# Patient Record
Sex: MALE | Race: WHITE | NOT HISPANIC OR LATINO | Employment: STUDENT | URBAN - METROPOLITAN AREA
[De-identification: names, ages, dates, MRNs, and addresses within clinical notes are randomized per-mention and may not be internally consistent; named-entity substitution may affect disease eponyms.]

---

## 2017-11-18 ENCOUNTER — APPOINTMENT (OUTPATIENT)
Dept: URGENT CARE | Age: 22
End: 2017-11-18
Payer: COMMERCIAL

## 2017-11-18 PROCEDURE — 99203 OFFICE O/P NEW LOW 30 MIN: CPT | Performed by: FAMILY MEDICINE

## 2018-06-03 ENCOUNTER — OFFICE VISIT (OUTPATIENT)
Dept: URGENT CARE | Age: 23
End: 2018-06-03
Payer: COMMERCIAL

## 2018-06-03 VITALS
RESPIRATION RATE: 18 BRPM | HEART RATE: 60 BPM | WEIGHT: 225 LBS | BODY MASS INDEX: 30.48 KG/M2 | OXYGEN SATURATION: 98 % | TEMPERATURE: 97.2 F | HEIGHT: 72 IN | DIASTOLIC BLOOD PRESSURE: 65 MMHG | SYSTOLIC BLOOD PRESSURE: 143 MMHG

## 2018-06-03 DIAGNOSIS — L60.0 INGROWN TOENAIL OF LEFT FOOT WITH INFECTION: Primary | ICD-10-CM

## 2018-06-03 PROCEDURE — 99213 OFFICE O/P EST LOW 20 MIN: CPT | Performed by: FAMILY MEDICINE

## 2018-06-03 RX ORDER — CIPROFLOXACIN 500 MG/1
500 TABLET, FILM COATED ORAL EVERY 12 HOURS SCHEDULED
Qty: 20 TABLET | Refills: 0 | Status: SHIPPED | OUTPATIENT
Start: 2018-06-03 | End: 2018-06-13

## 2018-06-03 RX ORDER — CETIRIZINE HYDROCHLORIDE 10 MG/1
10 TABLET ORAL DAILY
COMMUNITY

## 2018-06-03 NOTE — PROGRESS NOTES
[facility]        NAME: Madelaine Landers is a 25 y o  male  : 1995    MRN: [id number]  DATE: Hawa 3, 2018  TIME: 5:34 PM    Assessment and Plan   Ingrown toenail of left foot with infection [L60 0]  1  Ingrown toenail of left foot with infection  ciprofloxacin (CIPRO) 500 mg tablet         Patient Instructions       Follow up with PCP in 3-5 days  Proceed to  ER if symptoms worsen  Chief Complaint     Chief Complaint   Patient presents with    Ingrown Toenail     left great toe with pus and pain for approx 5 days  using peroxide         History of Present Illness       Patient is here for evaluation of redness swelling and purulent discharge from his left great toe  Patient has an ingrown toenail and things that may have started  He has been using some warm water and Epsom salt soaks with some relief  But is getting worse  Has more redness at this time  Review of Systems   Review of Systems   Constitutional: Negative  Musculoskeletal: Negative for gait problem  Skin: Positive for wound  Current Medications       Current Outpatient Prescriptions:     cetirizine (ZyrTEC) 10 mg tablet, Take 10 mg by mouth daily, Disp: , Rfl:     ciprofloxacin (CIPRO) 500 mg tablet, Take 1 tablet (500 mg total) by mouth every 12 (twelve) hours for 10 days, Disp: 20 tablet, Rfl: 0    Current Allergies     Allergies as of 2018 - Reviewed 2018   Allergen Reaction Noted    Clindamycin Rash 2018    Codeine Rash 2018            The following portions of the patient's history were reviewed and updated as appropriate: allergies, current medications, past family history, past medical history, past social history, past surgical history and problem list      No past medical history on file  No past surgical history on file  No family history on file  Medications have been verified          Objective   /65   Pulse 60   Temp (!) 97 2 °F (36 2 °C) (Temporal) Resp 18   Ht 6' (1 829 m)   Wt 102 kg (225 lb)   SpO2 98%   BMI 30 52 kg/m²        Physical Exam     Physical Exam   Constitutional: He is oriented to person, place, and time  He appears well-developed and well-nourished  Musculoskeletal:   Left great toe has an ingrown toenail on the medial aspect of the toe with focal swelling and focal erythema  No purulent discharge at this time  The area has some slight warmth to the medial aspect of the toe  Neurological: He is alert and oriented to person, place, and time  Skin: Skin is warm  Psychiatric: He has a normal mood and affect  His behavior is normal    Nursing note and vitals reviewed

## 2018-06-03 NOTE — PATIENT INSTRUCTIONS
1   Leave open to air at home  2   Take probiotics [i e  Yogurt, Acidophilus, Florastor (liquid)] daily  3   Continue warm water and Epsom salt soaks frequently  4    Advance activities as tolerated  5    Follow-up with a podiatrist in 3-4 days  6   Go to emergency room if symptoms are worsening

## 2018-08-07 ENCOUNTER — OFFICE VISIT (OUTPATIENT)
Dept: INTERNAL MEDICINE CLINIC | Age: 23
End: 2018-08-07
Payer: COMMERCIAL

## 2018-08-07 VITALS
OXYGEN SATURATION: 98 % | TEMPERATURE: 97.5 F | WEIGHT: 229.4 LBS | SYSTOLIC BLOOD PRESSURE: 117 MMHG | HEART RATE: 86 BPM | HEIGHT: 71 IN | BODY MASS INDEX: 32.11 KG/M2 | DIASTOLIC BLOOD PRESSURE: 74 MMHG

## 2018-08-07 DIAGNOSIS — J30.2 SEASONAL ALLERGIC RHINITIS, UNSPECIFIED TRIGGER: ICD-10-CM

## 2018-08-07 DIAGNOSIS — Z76.89 ENCOUNTER TO ESTABLISH CARE: Primary | ICD-10-CM

## 2018-08-07 PROBLEM — L60.0 INGROWN TOENAIL OF LEFT FOOT WITH INFECTION: Status: RESOLVED | Noted: 2018-06-03 | Resolved: 2018-08-07

## 2018-08-07 PROBLEM — S06.0X0A CONCUSSION WITH NO LOSS OF CONSCIOUSNESS: Status: RESOLVED | Noted: 2017-02-27 | Resolved: 2018-08-07

## 2018-08-07 PROBLEM — S06.0X0A CONCUSSION WITH NO LOSS OF CONSCIOUSNESS: Status: ACTIVE | Noted: 2017-02-27

## 2018-08-07 PROCEDURE — 99203 OFFICE O/P NEW LOW 30 MIN: CPT | Performed by: NURSE PRACTITIONER

## 2018-08-07 RX ORDER — MOMETASONE FUROATE 50 UG/1
2 SPRAY, METERED NASAL DAILY
COMMUNITY

## 2018-08-07 NOTE — PROGRESS NOTES
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   Return sooner as needed  Subjective:      Patient ID: Aster Campoverde is a 25 y o  male  Patient presents as a new patient to establish care with this practice  He denies new complaints at the present time  Upon review of his past medical history, he reports that he has seasonal allergies for which he takes Nasonex and Zyrtec  He reports that he is not consistent on taking them on a regular basis, and only takes them when needed  He understands that there best effective when taken regularly, but admits that he forgets to take them daily  He also reports that he had 2 significant concussion, for which she is [facility] in 2016  He reports that his symptoms have resolved, with the exception of some memory issues, for which he damien with using a calender in his phone frequently to help remind him of things  He also was unsure if he needs a 2nd meningitis vaccine  He also believes that he is due for a Tdap vaccine shortly, however his pediatric records are not available at the present time  He reports that he has his vaccine records at home and will bring him to the next appointment for review determination of whether he is due for these  He has not had recent labs performed  He does not remember the last time he had a health maintenance examination    He reports that 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, but that he did not bring it with him to this visit  The following portions of the patient's history were reviewed and updated as appropriate: allergies, current medications, past medical history, past social history, past surgical history and problem list     Review of Systems   Constitutional: Negative for chills, fatigue and fever  HENT: Negative for congestion, ear pain, hearing loss, sinus pain and trouble swallowing  Eyes: Negative for pain  Respiratory: Negative for chest tightness, shortness of breath and wheezing  Cardiovascular: Negative for chest pain, palpitations and leg swelling  Gastrointestinal: Negative for abdominal pain, diarrhea, nausea and vomiting  Genitourinary: Negative for dysuria  Musculoskeletal: Negative for gait problem  Skin: Negative for rash  Neurological: Negative for dizziness, syncope, numbness and headaches  Hematological: Does not bruise/bleed easily  Psychiatric/Behavioral: Negative for confusion and decreased concentration  The patient is not nervous/anxious  History reviewed  No pertinent past medical history        Current Outpatient Prescriptions:     cetirizine (ZyrTEC) 10 mg tablet, Take 10 mg by mouth daily, Disp: , Rfl:     mometasone (NASONEX) 50 mcg/act nasal spray, 2 sprays into each nostril daily, Disp: , Rfl:     Allergies   Allergen Reactions    Clindamycin Rash    Codeine Rash       Social History   Past Surgical History:   Procedure Laterality Date    TONSILLECTOMY  2012    WISDOM TOOTH EXTRACTION  2015     Family History   Problem Relation Age of Onset    Diabetes Mother     Hypertension Father     Alzheimer's disease Maternal Grandmother     Cancer Paternal Grandfather     Heart disease Paternal Grandfather        Objective:  /74 (BP Location: Left arm, Patient Position: Sitting, Cuff Size: Standard)   Pulse 86   Temp 97 5 °F (36 4 °C) (Tympanic)   Ht 5' 10 95" (1 802 m)   Wt 104 kg (229 lb 6 4 oz)   SpO2 98%   BMI 32 04 kg/m²        Physical Exam   Constitutional: He is oriented to person, place, and time  He appears well-developed and well-nourished  No distress  HENT:   Head: Normocephalic and atraumatic  Right Ear: External ear normal    Left Ear: External ear normal    Mouth/Throat: Oropharynx is clear and moist    Eyes: Conjunctivae are normal  Pupils are equal, round, and reactive to light  No scleral icterus  Neck: Normal range of motion  Neck supple  No thyromegaly present  Cardiovascular: Normal rate, regular rhythm and normal heart sounds  Pulmonary/Chest: Effort normal and breath sounds normal  No respiratory distress  Abdominal: Soft  Bowel sounds are normal  He exhibits no distension  Musculoskeletal: Normal range of motion  He exhibits no edema  Neurological: He is alert and oriented to person, place, and time  Skin: Skin is warm and dry  Psychiatric: He has a normal mood and affect  His behavior is normal  Judgment and thought content normal    Vitals reviewed

## 2018-08-14 ENCOUNTER — OFFICE VISIT (OUTPATIENT)
Dept: INTERNAL MEDICINE CLINIC | Age: 23
End: 2018-08-14
Payer: COMMERCIAL

## 2018-08-14 VITALS
DIASTOLIC BLOOD PRESSURE: 66 MMHG | TEMPERATURE: 97.7 F | SYSTOLIC BLOOD PRESSURE: 110 MMHG | BODY MASS INDEX: 31.89 KG/M2 | HEIGHT: 71 IN | OXYGEN SATURATION: 98 % | WEIGHT: 227.8 LBS | HEART RATE: 103 BPM

## 2018-08-14 DIAGNOSIS — Z00.00 HEALTHCARE MAINTENANCE: Primary | ICD-10-CM

## 2018-08-14 DIAGNOSIS — E66.09 CLASS 1 OBESITY DUE TO EXCESS CALORIES WITHOUT SERIOUS COMORBIDITY WITH BODY MASS INDEX (BMI) OF 31.0 TO 31.9 IN ADULT: ICD-10-CM

## 2018-08-14 DIAGNOSIS — Z13.220 SCREENING FOR HYPERLIPIDEMIA: ICD-10-CM

## 2018-08-14 PROBLEM — E66.811 CLASS 1 OBESITY DUE TO EXCESS CALORIES IN ADULT: Status: ACTIVE | Noted: 2018-08-14

## 2018-08-14 PROCEDURE — 99385 PREV VISIT NEW AGE 18-39: CPT | Performed by: NURSE PRACTITIONER

## 2018-08-14 NOTE — PROGRESS NOTES
Assessment/Plan:    Continue with healthy lifestyle  Exercise on a routine basis as well as healthy diet  Patient is sexually active  No concerns for STDs  He declines STD screening  Forms completed for [employer] [facility]    Will update routine labs     Diagnoses and all orders for this visit:    Healthcare maintenance    Screening for hyperlipidemia  -     Lipid panel; Future    Class 1 obesity due to excess calories without serious comorbidity with body mass index (BMI) of 31 0 to 31 9 in adult  -     CBC and differential; Future  -     Comprehensive metabolic panel; Future  -     Lipid panel; Future  -     TSH, 3rd generation with Free T4 reflex; Future        Subjective:      Patient ID: Marcia Alfaro is a 25 y o  male and presents today for Health Maintenance Physical     Pt needs forms completed for [employer] [facility]    Last Physical: few years ago    Pt reports overall health:  healthy    Healthy Diet:  Overall healthy  Eating primarily chicken, vegetable  High protein/high carb  Routine Exercise:  Cardio, light weight lifting, agility, approx 5-6 times a week  Weight Concerns:  no    Problems with vision:  Yes  Glasses  No contacts  Last Eye Exam:  1 year ago    Problems with Hearing:  no    Routine Dental Exams:  yes    Smoking History:  no  ETOH Use:  Socially, reduced greatly the pass few months  Illegal Drug Use:  no  Caffeine Use:  Coffee daily approx 12 oz  Testicular self exam:  no    Family History of prostate CA:  no  Family History of Colon CA:  No  Family history of testicular ca:  no    Immunizations:  Consider trumemba if living in [facility]     Patient states he has been exercising on a routine basis  Patient denies any chest pain, palpitations, syncope, near-syncope, lightheadedness, dizziness with exercise  States he has never had to stop exercising secondary to above reason  He denies a cardiac history    He denies a family history of sudden death prior to age of 48    The following portions of the patient's history were reviewed and updated as appropriate: allergies, current medications, past family history, past medical history, past social history, past surgical history and problem list     Review of Systems   Constitutional: Negative for chills, fatigue and fever  HENT: Negative for congestion, postnasal drip, sinus pain, sinus pressure and sore throat  Eyes: Negative for visual disturbance  Respiratory: Negative for cough, shortness of breath and wheezing  Cardiovascular: Negative for chest pain, palpitations and leg swelling  Gastrointestinal: Negative for abdominal pain, blood in stool, constipation, diarrhea, nausea and vomiting  Genitourinary: Negative for dysuria and hematuria  Musculoskeletal: Negative for arthralgias  Skin: Negative for rash  Neurological: Negative for dizziness, weakness, light-headedness and headaches  Psychiatric/Behavioral: Negative for behavioral problems, dysphoric mood and sleep disturbance  The patient is not nervous/anxious  No past medical history on file        Current Outpatient Prescriptions:     cetirizine (ZyrTEC) 10 mg tablet, Take 10 mg by mouth daily, Disp: , Rfl:     mometasone (NASONEX) 50 mcg/act nasal spray, 2 sprays into each nostril daily, Disp: , Rfl:     Allergies   Allergen Reactions    Clindamycin Rash    Codeine Rash       Social History   Past Surgical History:   Procedure Laterality Date    TONSILLECTOMY  2012    WISDOM TOOTH EXTRACTION  2015     Family History   Problem Relation Age of Onset    Diabetes Mother     Hypertension Father     Alzheimer's disease Maternal Grandmother     Cancer Paternal Grandfather     Heart disease Paternal Grandfather        Objective:  /66 (BP Location: Left arm, Patient Position: Sitting, Cuff Size: Large)   Pulse 103   Temp 97 7 °F (36 5 °C) (Tympanic)   Ht 5' 10 95" (1 802 m)   Wt 103 kg (227 lb 12 8 oz)   SpO2 98%   BMI 31 82 kg/m²      Physical Exam   Constitutional: He is oriented to person, place, and time  He appears well-developed and well-nourished  No distress  HENT:   Head: Normocephalic and atraumatic  Right Ear: Hearing, tympanic membrane, external ear and ear canal normal    Left Ear: Hearing, tympanic membrane, external ear and ear canal normal    Nose: Nose normal    Mouth/Throat: Uvula is midline, oropharynx is clear and moist and mucous membranes are normal    Neck: No thyromegaly present  Cardiovascular: Normal rate and regular rhythm  Pulmonary/Chest: Effort normal and breath sounds normal  No respiratory distress  He has no wheezes  Abdominal: Soft  Bowel sounds are normal  He exhibits no distension and no mass  There is no tenderness  There is no rebound and no guarding  Hernia confirmed negative in the right inguinal area and confirmed negative in the left inguinal area  Genitourinary: Penis normal  Right testis shows no mass, no swelling and no tenderness  Left testis shows no mass, no swelling and no tenderness  Neurological: He is alert and oriented to person, place, and time  No focal deficits   Skin: Skin is warm and dry  No rash noted  Psychiatric: He has a normal mood and affect  His behavior is normal  Judgment and thought content normal    Nursing note and vitals reviewed

## 2019-03-29 ENCOUNTER — OFFICE VISIT (OUTPATIENT)
Dept: INTERNAL MEDICINE CLINIC | Age: 24
End: 2019-03-29
Payer: COMMERCIAL

## 2019-03-29 VITALS
HEIGHT: 71 IN | WEIGHT: 220.4 LBS | OXYGEN SATURATION: 98 % | SYSTOLIC BLOOD PRESSURE: 132 MMHG | BODY MASS INDEX: 30.85 KG/M2 | HEART RATE: 72 BPM | DIASTOLIC BLOOD PRESSURE: 70 MMHG | TEMPERATURE: 98 F

## 2019-03-29 DIAGNOSIS — J01.00 ACUTE NON-RECURRENT MAXILLARY SINUSITIS: Primary | ICD-10-CM

## 2019-03-29 PROCEDURE — 3008F BODY MASS INDEX DOCD: CPT | Performed by: PHYSICIAN ASSISTANT

## 2019-03-29 PROCEDURE — 99213 OFFICE O/P EST LOW 20 MIN: CPT | Performed by: PHYSICIAN ASSISTANT

## 2019-03-29 PROCEDURE — 1036F TOBACCO NON-USER: CPT | Performed by: PHYSICIAN ASSISTANT

## 2019-03-29 RX ORDER — AMOXICILLIN 875 MG/1
875 TABLET, COATED ORAL 2 TIMES DAILY
Qty: 14 TABLET | Refills: 0 | Status: SHIPPED | OUTPATIENT
Start: 2019-03-29 | End: 2019-04-05

## 2019-03-29 NOTE — PROGRESS NOTES
[facility]  Standard Office Visit  Patient ID: Chuck Campa    : 1995  Age/Gender: 26 y o  male     DATE: 3/29/2019      Assessment/Plan:    Acute non-recurrent maxillary sinusitis  Discussed importance of increased rest increase fluids and hand hygiene  Patient currently is taking Zyrtec 10 mg daily  He previously was using Flonase only as needed  Recommended he use Flonase 1 spray to each nostril daily  Increase rest increased fluids  Hand hygiene  Amoxicillin 875 mg by mouth twice daily for 7 days  Report back if any worsening or if sx do no fully resolve  Diagnoses and all orders for this visit:    Acute non-recurrent maxillary sinusitis  -     amoxicillin (AMOXIL) 875 mg tablet; Take 1 tablet (875 mg total) by mouth 2 (two) times a day for 7 days          Subjective:   Chief Complaint   Patient presents with    Sore Throat     Pt is here today for sore throat since yesterday morning    Cough     Pt has been coughing since 2-3 days  Chuck Campa is a 26 y o  male who presents to the office on 3/29/2019 for     For eval of cold sx  Takes zyrtec and Flonase daily for seasonal allergies  He takes these meds year round  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  + sick contact brother with similar sx  Patient's sx started 2-3 days ago  Did not have ST prior, now has a ST  Feels raw  He has PND dripping at back of his throat  Tonsils were taken out due to hx of recurrent strep throat  Pain at back of throat  Feels like his ear needs to pop  Taking OTC mucinex  Patient notes he is able to eat and drink  Low-grade temp at home  Sinus pressure and pain  Ears feel blocked  Sore Throat    This is a new problem  The current episode started in the past 7 days  The problem has been gradually worsening  There has been no fever  The pain is at a severity of 4/10  The pain is mild   Associated symptoms include congestion (nasal congestion), coughing (Dry, brings up stuff from back of his throat   ), ear pain (pressure in his ear, fullness) and a plugged ear sensation  Pertinent negatives include no abdominal pain, diarrhea, headaches, hoarse voice, neck pain, shortness of breath, swollen glands, trouble swallowing or vomiting  The treatment provided mild relief  Cough   This is a new problem  The current episode started in the past 7 days  The problem has been waxing and waning  The problem occurs every few minutes  The cough is non-productive  Associated symptoms include ear pain (pressure in his ear, fullness), rhinorrhea and a sore throat  Pertinent negatives include no chest pain, headaches, shortness of breath or wheezing  Nothing aggravates the symptoms  The following portions of the patient's history were reviewed and updated as appropriate: allergies, current medications, past family history, past medical history, past social history, past surgical history and problem list     Review of Systems   HENT: Positive for congestion (nasal congestion), ear pain (pressure in his ear, fullness), rhinorrhea and sore throat  Negative for hoarse voice and trouble swallowing  Respiratory: Positive for cough (Dry, brings up stuff from back of his throat  )  Negative for shortness of breath and wheezing  Cardiovascular: Negative for chest pain and palpitations  Gastrointestinal: Negative for abdominal pain, diarrhea, nausea and vomiting  Musculoskeletal: Negative for neck pain  Neurological: Negative for headaches  Patient Active Problem List   Diagnosis    Seasonal allergic rhinitis    Healthcare maintenance    Screening for hyperlipidemia    Class 1 obesity due to excess calories in adult    Acute non-recurrent maxillary sinusitis       History reviewed  No pertinent past medical history      Past Surgical History:   Procedure Laterality Date    TONSILLECTOMY  2012    DEANNA TOOTH EXTRACTION  2015         Current Outpatient Medications:     cetirizine (ZyrTEC) 10 mg tablet, Take 10 mg by mouth daily, Disp: , Rfl:     mometasone (NASONEX) 50 mcg/act nasal spray, 2 sprays into each nostril daily, Disp: , Rfl:     amoxicillin (AMOXIL) 875 mg tablet, Take 1 tablet (875 mg total) by mouth 2 (two) times a day for 7 days, Disp: 14 tablet, Rfl: 0    Allergies   Allergen Reactions    Clindamycin Rash    Codeine Rash       Social History     Socioeconomic History    Marital status: Single     Spouse name: None    Number of children: None    Years of education: None    Highest education level: None   Occupational History    None   Social Needs    Financial resource strain: None    Food insecurity:     Worry: None     Inability: None    Transportation needs:     Medical: None     Non-medical: None   Tobacco Use    Smoking status: Never Smoker    Smokeless tobacco: Never Used   Substance and Sexual Activity    Alcohol use: Yes     Comment: social use    Drug use: No    Sexual activity: None   Lifestyle    Physical activity:     Days per week: None     Minutes per session: None    Stress: None   Relationships    Social connections:     Talks on phone: None     Gets together: None     Attends [employer] service: None     Active member of club or organization: None     Attends meetings of clubs or organizations: None     Relationship status: None    Intimate partner violence:     Fear of current or ex partner: None     Emotionally abused: None     Physically abused: None     Forced sexual activity: None   Other Topics Concern    None   Social History Narrative    None       Family History   Problem Relation Age of Onset    Diabetes Mother     Hypertension Father     Cancer Father     Alzheimer's disease Maternal Grandmother     Cancer Paternal Grandfather     Heart disease Paternal Grandfather        PHQ-9 Depression Screening    PHQ-9:    Frequency of the following problems over the past two weeks:              Health Maintenance   Topic Date Due    BMI: Followup Plan  10/14/2014    INFLUENZA VACCINE  03/29/2020 (Originally 7/1/2018)    Depression Screening PHQ  06/03/2019    BMI: Adult  08/14/2019    DTaP,Tdap,and Td Vaccines (7 - Td) 03/14/2028    HEPATITIS B VACCINES  Completed       Immunization History   Administered Date(s) Administered    DTaP,unspecified 01/15/1997, 03/19/1997, 05/21/1997, 01/29/1998, 08/06/2001    HPV 11/24/2012, 02/02/2013, 06/15/2013    Hep A, ped/adol, 2 dose 05/17/2010, 05/31/2011    Hep B, Adolescent or Pediatric 1995, 01/15/1997, 05/21/1997    HiB 01/15/1997, 03/19/1997, 05/21/1997, 10/31/1997    INFLUENZA 12/26/2005, 01/29/2007, 11/11/2008, 12/02/2009, 11/09/2010, 08/28/2012    IPV 08/06/2001    MMR 10/31/1997, 12/11/2000    Meningococcal ACWY, unspecified 03/14/2008, 11/24/2012    OPV 01/15/1997, 03/19/1997, 05/21/1997    Tdap 03/14/2018    Varicella 10/31/1997, 03/12/2007        Objective:  Vitals:    03/29/19 0904   BP: 132/70   BP Location: Left arm   Patient Position: Sitting   Cuff Size: Standard   Pulse: 72   Temp: 98 °F (36 7 °C)   TempSrc: Tympanic   SpO2: 98%   Weight: 100 kg (220 lb 6 4 oz)   Height: 5' 11 5" (1 816 m)     Wt Readings from Last 3 Encounters:   03/29/19 100 kg (220 lb 6 4 oz)   08/14/18 103 kg (227 lb 12 8 oz)   08/07/18 104 kg (229 lb 6 4 oz)     Body mass index is 30 31 kg/m²  No exam data present       Physical Exam   Constitutional: He is oriented to person, place, and time  He appears well-developed and well-nourished  No distress  Alert pleasant cooperative  Seated in room wearing mask    HENT:   Head: Normocephalic and atraumatic  Right Ear: No drainage or swelling  Tympanic membrane is not erythematous and not bulging  A middle ear effusion is present  Left Ear: No drainage or swelling  Tympanic membrane is not erythematous and not bulging  No middle ear effusion  Mouth/Throat: Uvula is midline and mucous membranes are normal  No oral lesions  No uvula swelling  Posterior oropharyngeal erythema present  No oropharyngeal exudate, posterior oropharyngeal edema or tonsillar abscesses (Tonsils surgically absent  )  Eyes: Pupils are equal, round, and reactive to light  Right eye exhibits no discharge  Left eye exhibits no discharge  No scleral icterus  Neck: Neck supple  Cardiovascular: Normal rate, regular rhythm and normal heart sounds  No murmur heard  Pulmonary/Chest: Effort normal and breath sounds normal  No respiratory distress  He has no wheezes  He has no rales  Abdominal: Soft  Bowel sounds are normal  He exhibits no distension  There is no tenderness  There is no guarding  Musculoskeletal: He exhibits no edema  Lymphadenopathy:     He has cervical adenopathy  Neurological: He is alert and oriented to person, place, and time  Skin: Skin is warm and dry  No rash noted  He is not diaphoretic  Psychiatric: He has a normal mood and affect  His behavior is normal  Thought content normal    Nursing note and vitals reviewed  No future appointments      Nazario Jensen PA-C   [facility]    Patient Care Team:  Agnieszka Wright DO as PCP - General (Family Medicine)

## 2019-03-29 NOTE — ASSESSMENT & PLAN NOTE
Discussed importance of increased rest increase fluids and hand hygiene  Patient currently is taking Zyrtec 10 mg daily  He previously was using Flonase only as needed  Recommended he use Flonase 1 spray to each nostril daily  Increase rest increased fluids  Hand hygiene  Amoxicillin 875 mg by mouth twice daily for 7 days  Report back if any worsening or if sx do no fully resolve

## 2019-03-29 NOTE — PATIENT INSTRUCTIONS
Acute non-recurrent maxillary sinusitis  Discussed importance of increased rest increase fluids and hand hygiene  Patient currently is taking Zyrtec 10 mg daily  He previously was using Flonase only as needed  Recommended he use Flonase 1 spray to each nostril daily  Increase rest increased fluids  Hand hygiene  Amoxicillin 875 mg by mouth twice daily for 7 days  Report back if any worsening or if sx do no fully resolve

## 2020-05-02 ENCOUNTER — TELEPHONE (OUTPATIENT)
Dept: PEDIATRICS CLINIC | Facility: CLINIC | Age: 25
End: 2020-05-02

## 2023-08-31 ENCOUNTER — TELEPHONE (OUTPATIENT)
Age: 28
End: 2023-08-31

## 2023-08-31 NOTE — TELEPHONE ENCOUNTER
Caller: Adri Eller w/ [facility]     Doctor: C    Reason for call: Calling to see if patient has apt     Call back#: n/a

## 2023-09-08 ENCOUNTER — TELEPHONE (OUTPATIENT)
Age: 28
End: 2023-09-08

## 2023-09-08 NOTE — TELEPHONE ENCOUNTER
Caller: Patient    Doctor: Nicolas Guerrero    Reason for call: Received cd from [place] with previous records. Noticed a MRI w.con right shoulder and the xray of right shoulder was missing. States if needs to get missing images will be a month and half wait time. Will Dr Nicolas Guerrero still see patient?     Call back#: [phone]

## 2023-09-11 NOTE — TELEPHONE ENCOUNTER
If the studies are new - we will need them. Dr Vianney Diop render an opinion without them and it will not be a fruitful visit. If the studies are old, then we will see him while he waits for the images, but should try to get the reports.

## 2023-09-12 NOTE — TELEPHONE ENCOUNTER
Caller: Patient     Doctor: Shaniec Enciso     Reason for call: Patient has the reports but not images, patient will bring them in.  He is unable to get the images     Call back#: [phone]

## 2023-09-19 NOTE — TELEPHONE ENCOUNTER
Patient was called if any films available of X-rays and MRI. Per patient unable to obtain the films from 2022 since done in [place]. Patient made aware will need to then do XR at the visit. Patient verbally understood.

## 2023-09-21 ENCOUNTER — OFFICE VISIT (OUTPATIENT)
Dept: OBGYN CLINIC | Facility: OTHER | Age: 28
End: 2023-09-21
Payer: COMMERCIAL

## 2023-09-21 ENCOUNTER — APPOINTMENT (OUTPATIENT)
Dept: RADIOLOGY | Facility: OTHER | Age: 28
End: 2023-09-21
Payer: COMMERCIAL

## 2023-09-21 VITALS
BODY MASS INDEX: 33.86 KG/M2 | WEIGHT: 250 LBS | HEIGHT: 72 IN | DIASTOLIC BLOOD PRESSURE: 90 MMHG | SYSTOLIC BLOOD PRESSURE: 126 MMHG

## 2023-09-21 DIAGNOSIS — M25.511 CHRONIC RIGHT SHOULDER PAIN: ICD-10-CM

## 2023-09-21 DIAGNOSIS — G89.29 CHRONIC RIGHT SHOULDER PAIN: ICD-10-CM

## 2023-09-21 DIAGNOSIS — M25.311 INSTABILITY OF RIGHT SHOULDER JOINT: Primary | ICD-10-CM

## 2023-09-21 DIAGNOSIS — M24.011 LOOSE BODY IN RIGHT SHOULDER: ICD-10-CM

## 2023-09-21 DIAGNOSIS — M25.511 RIGHT SHOULDER PAIN, UNSPECIFIED CHRONICITY: ICD-10-CM

## 2023-09-21 PROCEDURE — 99204 OFFICE O/P NEW MOD 45 MIN: CPT | Performed by: ORTHOPAEDIC SURGERY

## 2023-09-21 PROCEDURE — 73030 X-RAY EXAM OF SHOULDER: CPT

## 2023-09-21 RX ORDER — VARENICLINE TARTRATE 0.5 MG/1
0.5 TABLET, FILM COATED ORAL
COMMUNITY
Start: 2023-08-22

## 2023-09-21 RX ORDER — ERGOCALCIFEROL 1.25 MG/1
CAPSULE ORAL
COMMUNITY
Start: 2023-07-17

## 2023-09-21 NOTE — PATIENT INSTRUCTIONS
Diagnosis ICD-10-CM Associated Orders   1. Instability of right shoulder joint  M25.311 MRI shoulder right wo contrast      2. Chronic right shoulder pain  M25.511 XR shoulder 2+ vw right    G89.29 MRI shoulder right wo contrast      3. Loose body in right shoulder  x's 2  M24.011 MRI shoulder right wo contrast        Recommend updating his imaging with a new MRI. Discussed a right shoulder arthroscopy    Return for re-check after right shoulder MRI.

## 2023-09-21 NOTE — PROGRESS NOTES
Assessment  Diagnoses and all orders for this visit:    Instability of right shoulder joint - suspected posterior labrum tear  -     MRI shoulder right wo contrast; Future    Loose body in right shoulder  x's 2  -     MRI shoulder right wo contrast; Future          Discussion and Plan:    · The patient has an examination and clinical symptoms consistent with posterior labral pathology and recurrent instability with at least 2 separate intra-articular bodies seen on plain x-ray. He does have an outside MRI arthrogram from [place] in 2022 which we cannot see in the report is hard to interpret, given his persistent symptoms I do recommend updating the imaging so that we can better understand the best way to address his symptoms surgically. · In the presence of his chronic instability symptoms recommend updating his imaging with an MRI on the 3T unit for his right shoulder for further evaluation. On his plain radiographs he does have multiple loose bodies which will be better identified on the MRI scan. We are not obtaining a new MRI arthrogram as they are taking months to schedule and the 3 Deb magnet should provide us with enough information to help guide him properly with the type of surgical intervention, I suspect will show me labral pathology that would require arthroscopic labral repair to stabilize the shoulder. .   · Discussed that arthroscopy of his right shoulder but recommend updating his imaging for preoperative planning. Return for re-check after right shoulder MRI. Subjective:   Patient ID: Tiara Gallardo is a 33 y.o. male      69-year-old right-hand-dominant male presents today for evaluation of his right shoulder due to pain, instability and chronic dysfunction. He was in active [address] duty in 2021 when doing a log run and his partner tripped and fell resulting in the 250 pound log directly impacting his right shoulder resulting in a subluxation episode.   He has multiple episodes of instability. He had 2 years of physical therapy. He had injections of cortisone as well as activity modification and time. He has pain if he rolls onto his right shoulder at night. Lately he has been complaining of burning nerve pain to his forearm which is intermittent. He finds that his right shoulder symptoms limit his day-to-day activities as well as impedes on his quality of life. The following portions of the patient's history were reviewed and updated as appropriate: allergies, current medications, past family history, past medical history, past social history, past surgical history and problem list.    Review of Systems   All other systems reviewed and are negative. Objective:  /90   Ht 5' 11.5" (1.816 m)   Wt 113 kg (250 lb)   BMI 34.38 kg/m²       Right Shoulder Exam     Range of Motion   Right shoulder active abduction: 45 degrees. Forward flexion: 90 (passively to 110 degrees with appearance of subluxation)     Other   Erythema: absent  Sensation: normal    Comments:    Fairly decent strength however has quick fatigue with mild discomfort upon resistance            Physical Exam      Diagnostics, reviewed and taken today if performed as documented: The attending physician has personally reviewed the pertinent films in PACS and interpretation is as follows:    Right shoulder x-rays taken and reviewed in the office today show: Located glenohumeral joint with 2 ossific bodies adjacent to the proximal humerus.        Scribe Attestation    I,:  Issa Solis am acting as a scribe while in the presence of the attending physician.:       I,:  Michael Recinos MD personally performed the services described in this documentation    as scribed in my presence.:

## 2023-10-04 ENCOUNTER — HOSPITAL ENCOUNTER (OUTPATIENT)
Dept: RADIOLOGY | Age: 28
Discharge: HOME | End: 2023-10-04
Payer: COMMERCIAL

## 2023-10-04 DIAGNOSIS — M25.311 INSTABILITY OF RIGHT SHOULDER JOINT: ICD-10-CM

## 2023-10-04 DIAGNOSIS — M25.511 CHRONIC RIGHT SHOULDER PAIN: ICD-10-CM

## 2023-10-04 DIAGNOSIS — G89.29 CHRONIC RIGHT SHOULDER PAIN: ICD-10-CM

## 2023-10-04 DIAGNOSIS — M24.011 LOOSE BODY IN RIGHT SHOULDER: ICD-10-CM

## 2023-10-04 PROCEDURE — 73221 MRI JOINT UPR EXTREM W/O DYE: CPT

## 2023-10-04 PROCEDURE — G1004 CDSM NDSC: HCPCS

## 2023-10-09 ENCOUNTER — OFFICE VISIT (OUTPATIENT)
Dept: OBGYN CLINIC | Facility: OTHER | Age: 28
End: 2023-10-09
Payer: COMMERCIAL

## 2023-10-09 VITALS
WEIGHT: 250 LBS | HEART RATE: 62 BPM | HEIGHT: 72 IN | SYSTOLIC BLOOD PRESSURE: 130 MMHG | BODY MASS INDEX: 33.86 KG/M2 | DIASTOLIC BLOOD PRESSURE: 72 MMHG

## 2023-10-09 DIAGNOSIS — S43.431D LABRAL TEAR OF SHOULDER, RIGHT, SUBSEQUENT ENCOUNTER: Primary | ICD-10-CM

## 2023-10-09 DIAGNOSIS — M24.011 LOOSE BODY IN SHOULDER JOINT, RIGHT: ICD-10-CM

## 2023-10-09 PROCEDURE — 99214 OFFICE O/P EST MOD 30 MIN: CPT | Performed by: ORTHOPAEDIC SURGERY

## 2023-10-09 RX ORDER — CHLORHEXIDINE GLUCONATE ORAL RINSE 1.2 MG/ML
15 SOLUTION DENTAL ONCE
Status: CANCELLED | OUTPATIENT
Start: 2023-10-09 | End: 2023-10-09

## 2023-10-09 NOTE — PROGRESS NOTES
Assessment  Diagnoses and all orders for this visit:    Labral tear of shoulder, right, subsequent encounter    Loose body in the right shoulder joint      Discussion and Plan:    The patient has an examination and MRI consistent with a posterior labral tear. I have discussed with the patient the pathophysiology of this diagnosis and reviewed how the examination correlates with this diagnosis. Surgical vs conservative treatment options were discussed at length and after discussing these treatment options, the patient elected to proceed with a right shoulder arthroscopic labral tear with removal of loose body. A thorough discussion was had regarding the risks and benefits of the procedure. Risks discussed include but not limited to infection, neurovascular injury, risk of anesthesia, recurrent tear of the labrum, need for further surgery, stiffness requiring prolonged rehabilitation or return to the operating room for manipulation. After this discussion, all questions were answered and informed consent was obtained in the office for arthroscopic shoulder posterior labral repair. Subjective:   Patient ID: Maribell German is a 33 y.o. male      HPI  Patient presents today to discuss the findings of his right shoulder MRI. He was in active [address] duty in 2021 when doing a log run and his partner tripped and fell resulting in the 250 pound log directly impacting his right shoulder resulting in a subluxation episode. He has multiple episodes of instability. He had 2 years of physical therapy. He had injections of cortisone as well as activity modification and time. He has pain if he rolls onto his right shoulder at night. Lately he has been complaining of burning nerve pain to his forearm which is intermittent. He finds that his right shoulder symptoms limit his day-to-day activities as well as impedes on his quality of life.       The following portions of the patient's history were reviewed and updated as appropriate: allergies, current medications, past family history, past medical history, past social history, past surgical history and problem list.    Review of Systems   Constitutional: Negative for chills and fever. HENT: Negative for drooling and hearing loss. Eyes: Negative for visual disturbance. Respiratory: Negative for cough and shortness of breath. Cardiovascular: Negative for chest pain. Gastrointestinal: Negative for abdominal pain. Skin: Negative for rash. Psychiatric/Behavioral: Negative for agitation. Objective:  /72   Pulse 62   Ht 5' 11.5" (1.816 m)   Wt 113 kg (250 lb)   BMI 34.38 kg/m²     Right Shoulder Exam      Range of Motion   Right shoulder active abduction: 45 degrees. Forward flexion: 90 (passively to 110 degrees with appearance of subluxation)      Other   Erythema: absent  Sensation: normal     Comments:    Fairly decent strength however has quick fatigue with mild discomfort upon resistance       Physical Exam  Vitals and nursing note reviewed. Constitutional:       Appearance: He is well-developed. HENT:      Head: Normocephalic and atraumatic. Eyes:      Pupils: Pupils are equal, round, and reactive to light. Cardiovascular:      Rate and Rhythm: Normal rate and regular rhythm. Pulses: Normal pulses. Heart sounds: Normal heart sounds. Pulmonary:      Effort: Pulmonary effort is normal. No respiratory distress. Breath sounds: Normal breath sounds. Abdominal:      General: Abdomen is flat. There is no distension. Palpations: Abdomen is soft. Musculoskeletal:      Cervical back: Normal range of motion and neck supple. Skin:     General: Skin is warm and dry. Neurological:      Mental Status: He is alert and oriented to person, place, and time. Psychiatric:         Behavior: Behavior normal.           I have personally reviewed pertinent films in PACS and my interpretation is as follows.   MRI right shoulder demonstrates posterior labral tear with intra-articular loose body.      Scribe Attestation    I,:  Andrea Barlow am acting as a scribe while in the presence of the attending physician.:       I,:  Kitty Lucia MD personally performed the services described in this documentation    as scribed in my presence.:

## 2023-10-09 NOTE — PATIENT INSTRUCTIONS
You are being scheduled for a shoulder arthroscopy to treat your symptoms. Below are some instructions and information on what to expect before and after your surgery. Pre-Surgical Preparation for Arthroscopic Shoulder Surgery: You will be contacted the evening prior to your surgery to confirm the scheduled time of the procedure and when to arrive at the hospital.   Do not eat or drink anything after midnight the night before your surgery. Since you are having out-patient surgery, make sure that you have someone who can drive you home later in the day. Also, prepare that person for a long day, as the process of safely preparing for and recovering from the procedure is more time consuming than the actual procedure! As you will be in a sling after surgery, please wear or bring a loose fitting button-down shirt so that you can easily place this over the sling when you leave the surgical suite. This avoids having to place the operative arm in a sleeve. Most patients find that this is the easiest outfit to wear for the first week or so after surgery so you may want to plan accordingly. Most patients find that lying down in bed after shoulder surgery accentuates their discomfort. This is likely related to the effect of gravity on the swelling in the shoulder. As a result, most patients sleep better in a recliner or in bed with pillows propped up behind their back for the first few days or weeks after surgery. It is a good idea to plan for this ahead of time so there will be less hassle getting things set up the night after surgery. What to Expect After Arthroscopic Shoulder Surgery: It is normal to have swelling and discomfort in the shoulder for several days or a week after surgery. It is also normal to have a small amount of drainage from the surgical wounds (especially the first few days after surgery), as we put fluid into the shoulder to visualize the structures during surgery. It is NOT normal to have foul smelling, purulent drainage and if this is noted, please contact the office immediately or proceed to the emergency room for evaluation as this may indicate an infection. Applying ice bags to the shoulder may help with pain that is not controlled by the regional block. Ice should be applied 20-30 minutes at a time, every hour or two. Make sure to put a thin towel or T-shirt next to your skin to avoid direct contact of the ice with the skin. Icing is most helpful in the first 48 hours, although many people find that continuing past this time frame lessens their postoperative pain. Please note that your post-operative dressing is not conductive to ice, so if you need to, it is okay to remove that dressing even the night after surgery and place band-aids over the wounds in order for the ice to take effect. Pain Control    Most patients will receive a nerve block, the local anesthetic may keep your whole arm numb for up to 4 days. You will be given a prescription for narcotic pain medication when you are discharged from the hospital.  With the newer nerve block that is being utilized, patients are rarely requiring the use of this narcotic pain medication. If you find you do not tolerate that type of pain medicine well, call our office and we will try another one. In addition to the narcotic pain medication, it is safe to use an anti-inflammatory (unless the patient has a medical condition that would not allow safe use of this mediation). This includes the Advil, Motrin, Ibuprofen and Alleve category of medications. Simply follow the over the counter dosing on the package and use as indicated as another adjunct. Importantly since these medications are all very similar, use only one of them. Tylenol is a separate medication that can be utilized as well and can be taken at the same time as the other medication or given in a "staggered" manner.   Just make sure that you follow the dosing on the over the counter bottle instructions. Also make sure that the pain medication prescribed by Dr Lizandro Downing team does not contain acetaminophen (this is found in Percocet and Vicodin). Typically we do not prescribe those types of pain medications but if for some reason that has been prescribed DO NOT add more Tylenol (acetaminophen) as you could end up taking too much of that medication. As mentioned above, most patients find that lying down accentuates their discomfort. You might sleep better in a recliner, or propped up in bed. Dr. Claude Garay encourages patients to safely ambulate around the house as much as possible in the first few days after the procedure as this can help with blood circulation in the legs. While the incidence of blood clots is very rare following shoulder surgery, early ambulation is a great way to help decrease the already low rate. 24 hours after the surgery you may remove the bandage and cover incisions with Band-Aids if needed. At that time you may shower, the wounds will have a surgical glue that will protect them from shower water but do not submerge your incisions directly (bathing or swimming) until at least 2 weeks post-operatively. It is safe to let the arm hang at the side and take a shower and put on a shirt without the sling on. Just make sure that you do not use the operative are to reach out and grab anything as that may damage the repair. When you are done showering and getting dressed please return the operative arm to the sling. Unless noted otherwise in your discharge paperwork, Dr. Claude Garay uses absorbable sutures so they do not need to be removed. Dr. Hortensia Curiel physician assistant (PA) will see you in the office a few days after the procedure to review the intra-operative findings and to initiate physical therapy if appropriate.   A post-operative appointment should have been scheduled for you already, but if for some reason this did not happen, please call the office to make one. Physical therapy is important after nearly all shoulder surgeries and a detailed rehabilitation plan based on the specific intra-operative findings and procedures will be provided to your therapist at the first post-operative office visit. Most patients have post-operative therapy appointments scheduled pre-operatively, but if you do not, that will be handled at the first post-operative office visit. Unless expressly directed otherwise it is safe to remove the sling even the first day after the surgery and let the arm hang by the side. This allows patients to shower and even put a shirt on (bad arm in the sleeve first). It is also safe to flex and extend their wrist, hand and fingers as much as possible when the block wears off. These simple motions can serve to pump fluid out of the forearm and decrease swelling in the arm.

## 2023-10-09 NOTE — H&P (VIEW-ONLY)
Assessment  Diagnoses and all orders for this visit:    Labral tear of shoulder, right, subsequent encounter    Loose body in the right shoulder joint      Discussion and Plan:    The patient has an examination and MRI consistent with a posterior labral tear. I have discussed with the patient the pathophysiology of this diagnosis and reviewed how the examination correlates with this diagnosis. Surgical vs conservative treatment options were discussed at length and after discussing these treatment options, the patient elected to proceed with a right shoulder arthroscopic labral tear with removal of loose body. A thorough discussion was had regarding the risks and benefits of the procedure. Risks discussed include but not limited to infection, neurovascular injury, risk of anesthesia, recurrent tear of the labrum, need for further surgery, stiffness requiring prolonged rehabilitation or return to the operating room for manipulation. After this discussion, all questions were answered and informed consent was obtained in the office for arthroscopic shoulder posterior labral repair. Subjective:   Patient ID: Juan Lemon is a 33 y.o. male      HPI  Patient presents today to discuss the findings of his right shoulder MRI. He was in active [address] duty in 2021 when doing a log run and his partner tripped and fell resulting in the 250 pound log directly impacting his right shoulder resulting in a subluxation episode. He has multiple episodes of instability. He had 2 years of physical therapy. He had injections of cortisone as well as activity modification and time. He has pain if he rolls onto his right shoulder at night. Lately he has been complaining of burning nerve pain to his forearm which is intermittent. He finds that his right shoulder symptoms limit his day-to-day activities as well as impedes on his quality of life.       The following portions of the patient's history were reviewed and updated as appropriate: allergies, current medications, past family history, past medical history, past social history, past surgical history and problem list.    Review of Systems   Constitutional: Negative for chills and fever. HENT: Negative for drooling and hearing loss. Eyes: Negative for visual disturbance. Respiratory: Negative for cough and shortness of breath. Cardiovascular: Negative for chest pain. Gastrointestinal: Negative for abdominal pain. Skin: Negative for rash. Psychiatric/Behavioral: Negative for agitation. Objective:  /72   Pulse 62   Ht 5' 11.5" (1.816 m)   Wt 113 kg (250 lb)   BMI 34.38 kg/m²     Right Shoulder Exam      Range of Motion   Right shoulder active abduction: 45 degrees. Forward flexion: 90 (passively to 110 degrees with appearance of subluxation)      Other   Erythema: absent  Sensation: normal     Comments:    Fairly decent strength however has quick fatigue with mild discomfort upon resistance       Physical Exam  Vitals and nursing note reviewed. Constitutional:       Appearance: He is well-developed. HENT:      Head: Normocephalic and atraumatic. Eyes:      Pupils: Pupils are equal, round, and reactive to light. Cardiovascular:      Rate and Rhythm: Normal rate and regular rhythm. Pulses: Normal pulses. Heart sounds: Normal heart sounds. Pulmonary:      Effort: Pulmonary effort is normal. No respiratory distress. Breath sounds: Normal breath sounds. Abdominal:      General: Abdomen is flat. There is no distension. Palpations: Abdomen is soft. Musculoskeletal:      Cervical back: Normal range of motion and neck supple. Skin:     General: Skin is warm and dry. Neurological:      Mental Status: He is alert and oriented to person, place, and time. Psychiatric:         Behavior: Behavior normal.           I have personally reviewed pertinent films in PACS and my interpretation is as follows.   MRI right shoulder demonstrates posterior labral tear with intra-articular loose body.      Scribe Attestation    I,:  David Pacheco am acting as a scribe while in the presence of the attending physician.:       I,:  Diane Powell MD personally performed the services described in this documentation    as scribed in my presence.:

## 2023-10-11 ENCOUNTER — ANESTHESIA EVENT (OUTPATIENT)
Dept: PERIOP | Facility: AMBULARY SURGERY CENTER | Age: 28
End: 2023-10-11
Payer: COMMERCIAL

## 2023-10-16 NOTE — PRE-PROCEDURE INSTRUCTIONS
Pre-Surgery Instructions:   Medication Instructions    cetirizine (ZyrTEC) 10 mg tablet Hold day of surgery. ergocalciferol (ERGOCALCIFEROL) 1.25 MG (50141 UT) capsule Stop taking 7 days prior to surgery. mometasone (NASONEX) 50 mcg/act nasal spray Uses PRN- OK to take day of surgery    varenicline (CHANTIX) 0.5 mg tablet Take day of surgery. Medication instructions for day surgery reviewed. Please use only a sip of water to take your instructed medications. Avoid all over the counter vitamins, supplements and NSAIDS for one week prior to surgery per anesthesia guidelines. Tylenol is ok to take as needed. You will receive a call one business day prior to surgery with an arrival time and hospital directions. If your surgery is scheduled on a Monday, the hospital will be calling you on the Friday prior to your surgery. If you have not heard from anyone by 8pm, please call the hospital supervisor through the hospital  at [phone]. Joe Wright [phone]). Do not eat or drink anything after midnight the night before your surgery, including candy, mints, lifesavers, or chewing gum. Do not drink alcohol 24hrs before your surgery. Try not to smoke at least 24hrs before your surgery. Follow the pre surgery showering instructions as listed in the [facility] Surgical Experience Booklet” or otherwise provided by your surgeon's office. Do not shave the surgical area 24 hours before surgery. Do not apply any lotions, creams, including makeup, cologne, deodorant, or perfumes after showering on the day of your surgery. No contact lenses, eye make-up, or artificial eyelashes. Remove nail polish, including gel polish, and any artificial, gel, or acrylic nails if possible. Remove all jewelry including rings and body piercing jewelry. Wear causal clothing that is easy to take on and off. Consider your type of surgery. Keep any valuables, jewelry, piercings at home.  Please bring any specially ordered equipment (sling, braces) if indicated. Arrange for a responsible person to drive you to and from the hospital on the day of your surgery. Visitor Guidelines discussed. Call the surgeon's office with any new illnesses, exposures, or additional questions prior to surgery. Please reference your [facility] Surgical Experience Booklet” for additional information to prepare for your upcoming surgery.

## 2023-10-17 NOTE — DISCHARGE INSTR - AVS FIRST PAGE
You are being scheduled for a shoulder arthroscopy to treat your symptoms. Below are some instructions and information on what to expect before and after your surgery. Pre-Surgical Preparation for Arthroscopic Shoulder Surgery: You will be contacted the evening prior to your surgery to confirm the scheduled time of the procedure and when to arrive at the hospital.   Do not eat or drink anything after midnight the night before your surgery. Since you are having out-patient surgery, make sure that you have someone who can drive you home later in the day. Also, prepare that person for a long day, as the process of safely preparing for and recovering from the procedure is more time consuming than the actual procedure! As you will be in a sling after surgery, please wear or bring a loose fitting button-down shirt so that you can easily place this over the sling when you leave the surgical suite. This avoids having to place the operative arm in a sleeve. Most patients find that this is the easiest outfit to wear for the first week or so after surgery so you may want to plan accordingly. Most patients find that lying down in bed after shoulder surgery accentuates their discomfort. This is likely related to the effect of gravity on the swelling in the shoulder. As a result, most patients sleep better in a recliner or in bed with pillows propped up behind their back for the first few days or weeks after surgery. It is a good idea to plan for this ahead of time so there will be less hassle getting things set up the night after surgery. What to Expect After Arthroscopic Shoulder Surgery: It is normal to have swelling and discomfort in the shoulder for several days or a week after surgery. It is also normal to have a small amount of drainage from the surgical wounds (especially the first few days after surgery), as we put fluid into the shoulder to visualize the structures during surgery. It is NOT normal to have foul smelling, purulent drainage and if this is noted, please contact the office immediately or proceed to the emergency room for evaluation as this may indicate an infection. Applying ice bags to the shoulder may help with pain that is not controlled by the regional block. Ice should be applied 20-30 minutes at a time, every hour or two. Make sure to put a thin towel or T-shirt next to your skin to avoid direct contact of the ice with the skin. Icing is most helpful in the first 48 hours, although many people find that continuing past this time frame lessens their postoperative pain. Please note that your post-operative dressing is not conductive to ice, so if you need to, it is okay to remove that dressing even the night after surgery and place band-aids over the wounds in order for the ice to take effect. Pain Control    Most patients will receive a nerve block, the local anesthetic may keep your whole arm numb for up to 4 days. You will be given a prescription for narcotic pain medication when you are discharged from the hospital.  With the newer nerve block that is being utilized, patients are rarely requiring the use of this narcotic pain medication. If you find you do not tolerate that type of pain medicine well, call our office and we will try another one. In addition to the narcotic pain medication, it is safe to use an anti-inflammatory (unless the patient has a medical condition that would not allow safe use of this mediation). This includes the Advil, Motrin, Ibuprofen and Alleve category of medications. Simply follow the over the counter dosing on the package and use as indicated as another adjunct. Importantly since these medications are all very similar, use only one of them. Tylenol is a separate medication that can be utilized as well and can be taken at the same time as the other medication or given in a "staggered" manner.   Just make sure that you follow the dosing on the over the counter bottle instructions. Also make sure that the pain medication prescribed by Dr Reanna Giordano team does not contain acetaminophen (this is found in Percocet and Vicodin). Typically we do not prescribe those types of pain medications but if for some reason that has been prescribed DO NOT add more Tylenol (acetaminophen) as you could end up taking too much of that medication. As mentioned above, most patients find that lying down accentuates their discomfort. You might sleep better in a recliner, or propped up in bed. Dr. Reilly Caba encourages patients to safely ambulate around the house as much as possible in the first few days after the procedure as this can help with blood circulation in the legs. While the incidence of blood clots is very rare following shoulder surgery, early ambulation is a great way to help decrease the already low rate. 24 hours after the surgery you may remove the bandage and cover incisions with Band-Aids if needed. At that time you may shower, the wounds will have a surgical glue that will protect them from shower water but do not submerge your incisions directly (bathing or swimming) until at least 2 weeks post-operatively. It is safe to let the arm hang at the side and take a shower and put on a shirt without the sling on. Just make sure that you do not use the operative are to reach out and grab anything as that may damage the repair. When you are done showering and getting dressed please return the operative arm to the sling. Unless noted otherwise in your discharge paperwork, Dr. Reilly Caba uses absorbable sutures so they do not need to be removed. Dr. Deny Reddy physician assistant (PA) will see you in the office a few days after the procedure to review the intra-operative findings and to initiate physical therapy if appropriate.   A post-operative appointment should have been scheduled for you already, but if for some reason this did not happen, please call the office to make one. Physical therapy is important after nearly all shoulder surgeries and a detailed rehabilitation plan based on the specific intra-operative findings and procedures will be provided to your therapist at the first post-operative office visit. Most patients have post-operative therapy appointments scheduled pre-operatively, but if you do not, that will be handled at the first post-operative office visit. Unless expressly directed otherwise it is safe to remove the sling even the first day after the surgery and let the arm hang by the side. This allows patients to shower and even put a shirt on (bad arm in the sleeve first). It is also safe to flex and extend their wrist, hand and fingers as much as possible when the block wears off. These simple motions can serve to pump fluid out of the forearm and decrease swelling in the arm.

## 2023-10-18 ENCOUNTER — ANESTHESIA (OUTPATIENT)
Dept: PERIOP | Facility: AMBULARY SURGERY CENTER | Age: 28
End: 2023-10-18
Payer: COMMERCIAL

## 2023-10-18 ENCOUNTER — HOSPITAL ENCOUNTER (OUTPATIENT)
Facility: AMBULARY SURGERY CENTER | Age: 28
Setting detail: OUTPATIENT SURGERY
Discharge: HOME | End: 2023-10-18
Attending: ORTHOPAEDIC SURGERY | Admitting: ORTHOPAEDIC SURGERY
Payer: COMMERCIAL

## 2023-10-18 VITALS
HEIGHT: 72 IN | SYSTOLIC BLOOD PRESSURE: 122 MMHG | BODY MASS INDEX: 34.81 KG/M2 | OXYGEN SATURATION: 95 % | WEIGHT: 257 LBS | HEART RATE: 63 BPM | TEMPERATURE: 97.1 F | DIASTOLIC BLOOD PRESSURE: 55 MMHG | RESPIRATION RATE: 18 BRPM

## 2023-10-18 DIAGNOSIS — M24.011 LOOSE BODY IN SHOULDER JOINT, RIGHT: ICD-10-CM

## 2023-10-18 DIAGNOSIS — S43.431D LABRAL TEAR OF SHOULDER, RIGHT, SUBSEQUENT ENCOUNTER: Primary | ICD-10-CM

## 2023-10-18 PROCEDURE — C1713 ANCHOR/SCREW BN/BN,TIS/BN: HCPCS | Performed by: ORTHOPAEDIC SURGERY

## 2023-10-18 PROCEDURE — C9290 INJ, BUPIVACAINE LIPOSOME: HCPCS | Performed by: ANESTHESIOLOGY

## 2023-10-18 DEVICE — FIBERTAK WITH SUTURETAPE
Type: IMPLANTABLE DEVICE | Site: SHOULDER | Status: FUNCTIONAL
Brand: ARTHREX®

## 2023-10-18 RX ORDER — ONDANSETRON 2 MG/ML
INJECTION INTRAMUSCULAR; INTRAVENOUS AS NEEDED
Status: DISCONTINUED | OUTPATIENT
Start: 2023-10-18 | End: 2023-10-18

## 2023-10-18 RX ORDER — FENTANYL CITRATE/PF 50 MCG/ML
50 SYRINGE (ML) INJECTION
Status: DISCONTINUED | OUTPATIENT
Start: 2023-10-18 | End: 2023-10-18 | Stop reason: HOSPADM

## 2023-10-18 RX ORDER — ONDANSETRON 2 MG/ML
4 INJECTION INTRAMUSCULAR; INTRAVENOUS ONCE AS NEEDED
Status: DISCONTINUED | OUTPATIENT
Start: 2023-10-18 | End: 2023-10-18 | Stop reason: HOSPADM

## 2023-10-18 RX ORDER — ONDANSETRON 2 MG/ML
4 INJECTION INTRAMUSCULAR; INTRAVENOUS EVERY 6 HOURS PRN
Status: DISCONTINUED | OUTPATIENT
Start: 2023-10-18 | End: 2023-10-18 | Stop reason: HOSPADM

## 2023-10-18 RX ORDER — PROPOFOL 10 MG/ML
INJECTION, EMULSION INTRAVENOUS AS NEEDED
Status: DISCONTINUED | OUTPATIENT
Start: 2023-10-18 | End: 2023-10-18

## 2023-10-18 RX ORDER — MIDAZOLAM HYDROCHLORIDE 2 MG/2ML
INJECTION, SOLUTION INTRAMUSCULAR; INTRAVENOUS AS NEEDED
Status: DISCONTINUED | OUTPATIENT
Start: 2023-10-18 | End: 2023-10-18

## 2023-10-18 RX ORDER — ACETAMINOPHEN 325 MG/1
650 TABLET ORAL EVERY 6 HOURS PRN
Status: DISCONTINUED | OUTPATIENT
Start: 2023-10-18 | End: 2023-10-18 | Stop reason: HOSPADM

## 2023-10-18 RX ORDER — FENTANYL CITRATE 50 UG/ML
INJECTION, SOLUTION INTRAMUSCULAR; INTRAVENOUS AS NEEDED
Status: DISCONTINUED | OUTPATIENT
Start: 2023-10-18 | End: 2023-10-18

## 2023-10-18 RX ORDER — DEXAMETHASONE SODIUM PHOSPHATE 10 MG/ML
INJECTION, SOLUTION INTRAMUSCULAR; INTRAVENOUS AS NEEDED
Status: DISCONTINUED | OUTPATIENT
Start: 2023-10-18 | End: 2023-10-18

## 2023-10-18 RX ORDER — LIDOCAINE HYDROCHLORIDE 20 MG/ML
INJECTION, SOLUTION EPIDURAL; INFILTRATION; INTRACAUDAL; PERINEURAL AS NEEDED
Status: DISCONTINUED | OUTPATIENT
Start: 2023-10-18 | End: 2023-10-18

## 2023-10-18 RX ORDER — BUPIVACAINE HYDROCHLORIDE 5 MG/ML
INJECTION, SOLUTION EPIDURAL; INTRACAUDAL
Status: COMPLETED | OUTPATIENT
Start: 2023-10-18 | End: 2023-10-18

## 2023-10-18 RX ORDER — SODIUM CHLORIDE, SODIUM LACTATE, POTASSIUM CHLORIDE, CALCIUM CHLORIDE 600; 310; 30; 20 MG/100ML; MG/100ML; MG/100ML; MG/100ML
INJECTION, SOLUTION INTRAVENOUS CONTINUOUS PRN
Status: DISCONTINUED | OUTPATIENT
Start: 2023-10-18 | End: 2023-10-18

## 2023-10-18 RX ORDER — CHLORHEXIDINE GLUCONATE ORAL RINSE 1.2 MG/ML
15 SOLUTION DENTAL ONCE
Status: DISCONTINUED | OUTPATIENT
Start: 2023-10-18 | End: 2023-10-18 | Stop reason: HOSPADM

## 2023-10-18 RX ORDER — CEFAZOLIN SODIUM 2 G/50ML
2000 SOLUTION INTRAVENOUS ONCE
Status: COMPLETED | OUTPATIENT
Start: 2023-10-18 | End: 2023-10-18

## 2023-10-18 RX ORDER — OXYCODONE HYDROCHLORIDE 5 MG/1
5 TABLET ORAL EVERY 4 HOURS PRN
Status: DISCONTINUED | OUTPATIENT
Start: 2023-10-18 | End: 2023-10-18 | Stop reason: HOSPADM

## 2023-10-18 RX ORDER — OXYCODONE HYDROCHLORIDE 5 MG/1
TABLET ORAL
Qty: 13 TABLET | Refills: 0 | Status: SHIPPED | OUTPATIENT
Start: 2023-10-18

## 2023-10-18 RX ADMIN — MIDAZOLAM 2 MG: 1 INJECTION INTRAMUSCULAR; INTRAVENOUS at 08:09

## 2023-10-18 RX ADMIN — FENTANYL CITRATE 25 MCG: 50 INJECTION INTRAMUSCULAR; INTRAVENOUS at 08:54

## 2023-10-18 RX ADMIN — BUPIVACAINE 20 ML: 13.3 INJECTION, SUSPENSION, LIPOSOMAL INFILTRATION at 08:05

## 2023-10-18 RX ADMIN — ACETAMINOPHEN 650 MG: 325 TABLET, FILM COATED ORAL at 10:40

## 2023-10-18 RX ADMIN — DEXAMETHASONE SODIUM PHOSPHATE 10 MG: 10 INJECTION INTRAMUSCULAR; INTRAVENOUS at 08:23

## 2023-10-18 RX ADMIN — SODIUM CHLORIDE, SODIUM LACTATE, POTASSIUM CHLORIDE, AND CALCIUM CHLORIDE: .6; .31; .03; .02 INJECTION, SOLUTION INTRAVENOUS at 07:40

## 2023-10-18 RX ADMIN — FENTANYL CITRATE 25 MCG: 50 INJECTION INTRAMUSCULAR; INTRAVENOUS at 08:37

## 2023-10-18 RX ADMIN — FENTANYL CITRATE 50 MCG: 50 INJECTION INTRAMUSCULAR; INTRAVENOUS at 09:34

## 2023-10-18 RX ADMIN — LIDOCAINE HYDROCHLORIDE 100 MG: 20 INJECTION, SOLUTION EPIDURAL; INFILTRATION; INTRACAUDAL; PERINEURAL at 08:19

## 2023-10-18 RX ADMIN — ONDANSETRON 4 MG: 2 INJECTION INTRAMUSCULAR; INTRAVENOUS at 08:23

## 2023-10-18 RX ADMIN — PROPOFOL 300 MG: 10 INJECTION, EMULSION INTRAVENOUS at 08:19

## 2023-10-18 RX ADMIN — FENTANYL CITRATE 50 MCG: 50 INJECTION INTRAMUSCULAR; INTRAVENOUS at 08:23

## 2023-10-18 RX ADMIN — BUPIVACAINE HYDROCHLORIDE 5 ML: 5 INJECTION, SOLUTION EPIDURAL; INTRACAUDAL at 08:05

## 2023-10-18 RX ADMIN — CEFAZOLIN SODIUM 2000 MG: 2 SOLUTION INTRAVENOUS at 08:22

## 2023-10-18 NOTE — OP NOTE
OPERATIVE REPORT  PATIENT NAME: Radha Hernandez    :  1995  MRN: [id number]  Pt Location: [facility] OR [facility] 05    SURGERY DATE: 10/18/2023     SURGEON: Jamie Jenkins MD     ASSISTANT: Amanda Webber PA-C     NOTE: Amanda Webber PA-C was present throughout the entire procedure and performed essential assistance with patient prepping, draping, positioning, suture management, wound closure, sterile dressing application and sling application, all under my direct supervision. NOTE: No qualified resident physician was available for assistance    PREOPERATIVE DIAGNOSIS:  Right Shoulder Posterior Glenoid Labrum Tear    POSTOPERATIVE DIAGNOSIS: Same    PROCEDURES: Surgical Arthroscopy Right Shoulder with Posterior Glenoid Labrum Repair    ANESTHESIA STAFF: Allison Kraft MD     ANESTHESIA TYPE: General LMA with ultrasound guided interscalene block (Exparel). The interscalene block was provided by the anesthesia staff per my request for postoperative pain control and to decrease the use of postoperative narcotic medication for pain control. COMPLICATIONS: None    FINDINGS: Posterior Glenoid Labrum Tear    SPECIMEN(S):  None    ESTIMATED BLOOD LOSS: Minimal    INDICATION:  Briefly, the patient is a 33 y.o.  male with right shoulder pain. MRI scan confirmed a posterior labrum tear with a suspected loose body versus labral fragment. The patient elected for arthroscopic treatment. Informed consent was obtained after a thorough discussion of the risks and benefits of the procedure, as well as alternatives to the procedure. OPERATIVE TECHNIQUE:  On the day of surgery, I identified the patient’s right shoulder and marked it with my initials. The patient was taken to the operating room where anesthesia was induced and 2 grams of IV Cefazolin were given.  The patient was examined in the supine position and was found to have full range of motion of the right shoulder with isolated posterior instability by load and shift testing. The patient was then positioned in the [address] [place] [address] chair position. All bony prominences were padded. The shoulder was prepped and draped in normal sterile fashion. After a time-out for safety, a standard posterior arthroscopic portal was made. Glenohumeral evaluation revealed some early degenerative changes of the glenoid including some grade 2 cartilage changes associated with a large posterior labral tear. The humeral head had preserved articular cartilage. There and superior labrum were intact but there was a large posterior labral tear with a superior and inferior loose fragment that was clearly seen flipping into and out of the joint. There were no loose bodies seen in the axillary pouch or subscap recess so the suspected loose body was indeed these displaced labral fragments. The undersurface of the rotator cuff and long head of biceps were intact without partial tear or instability. An anterior and posterior cannula was established and while viewing from the anterior superior viewing portal a debridement of the labral fragments performed to a stable border, the posterior labrum was found to be detached from the glenoid hence requiring suture anchor repair. Using three Arthrex All Suture FiberTak anchors, the posterior labrum labrum tear was repaired to the glenoid with anatomic tension utilizing a simple suture from each anchor. Posterior band of the infra glenohumeral augment was found to be appropriately tensioned after repair and humeral head was found to be centered. Debridement of the grade 2 articular cartilage changes of the posterior glenoid was performed to a stable border, no full-thickness cause loss was seen. The area was then irrigated. Scope was withdrawn. Wounds were closed with 4-0 Monocryl and Histoacryl. Sterile dressings and a sling with an abduction pillow was placed.  The patient was awoken without complication and returned to the recovery room in good condition. We will see the patient back in the office next week to initiate therapy following posterior labral repair rehabilitation protocol. At the end of procedure, the counts were correct.      PATIENT DISPOSITION:  Stable to PACU      SIGNATURE: Christa Carvalho MD  DATE: October 18, 2023  TIME: 9:11 AM

## 2023-10-18 NOTE — ANESTHESIA POSTPROCEDURE EVALUATION
Post-Op Assessment Note    CV Status:  Stable  Pain Score: 0    Pain management: adequate     Mental Status:  Sleepy   Hydration Status:  Euvolemic   PONV Controlled:  Controlled   Airway Patency:  Patent      Post Op Vitals Reviewed: Yes      Staff: Anesthesiologist, CRNA         No notable events documented.     /56 (10/18/23 0920)    Temp (!) 97 °F (36.1 °C) (10/18/23 0920)    Pulse 56 (10/18/23 0920)   Resp 12 (10/18/23 0920)    SpO2 94 % (10/18/23 0920)

## 2023-10-18 NOTE — ANESTHESIA PROCEDURE NOTES
Peripheral Block    Patient location during procedure: holding area  Start time: 10/18/2023 8:00 AM  Reason for block: at surgeon's request and post-op pain management  Staffing  Performed by: Velna Eisenmenger, MD  Authorized by: Velna Eisenmenger, MD    Preanesthetic Checklist  Completed: patient identified, IV checked, site marked, risks and benefits discussed, surgical consent, monitors and equipment checked, pre-op evaluation and timeout performed  Peripheral Block  Patient position: sitting  Prep: ChloraPrep  Patient monitoring: frequent blood pressure checks, continuous pulse oximetry and heart rate  Block type: Interscalene  Laterality: right  Injection technique: single-shot  Procedures: ultrasound guided, Ultrasound guidance required for the procedure to increase accuracy and safety of medication placement and decrease risk of complications.   Ultrasound permanent image savedbupivacaine (PF) (MARCAINE) 0.5 % injection 20 mL - Perineural   5 mL - 10/18/2023 8:05:00 AM  bupivacaine liposomal (EXPAREL) 1.3 % injection 20 mL - Perineural   20 mL - 10/18/2023 8:05:00 AM  Needle  Needle type: Stimuplex   Needle gauge: 20 G  Needle length: 4 in  Needle localization: anatomical landmarks and ultrasound guidance  Needle insertion depth: 4 cm  Assessment  Injection assessment: incremental injection, frequent aspiration, injected with ease, negative aspiration, negative for heart rate change, no paresthesia on injection, no symptoms of intraneural/intravenous injection and needle tip visualized at all times  Paresthesia pain: none  Post-procedure:  site cleaned  patient tolerated the procedure well with no immediate complications

## 2023-10-18 NOTE — INTERVAL H&P NOTE
[facility] - Consult Note    Consultation request by Jacinto Napier MD for advice/opinion related to evaluating Syncope [R55]    Date of  Admission: 1/26/2021  9:53 PM   Primary Care Physician:  Edda Grewal MD      I saw, evaluated, interviewed and examined the patient personally.  I agree with the findings and plan of care as documented below with BRIAN note  Patient was brought to the hospital after possible bradycardia related syncope.  Patient has significant memory loss and dementia and is not able to provide any meaningful history.  History obtained by medical records and by discussion daughter who lives with the patient.  Based on history of pleural by daughter, patient was not being able to awaken by daughter for about a minute.  She cannot clearly tell if she lost consciousness or she was in a deep sleep.  According to daughter sometimes when she is in deep sleep she is hard to wake up and then she gets angry.  Unable to clearly tell whether she had a syncopal episode or not  Patient with advanced age and likely has some sort of degenerative conduction abnormality.  EKG with sinus bradycardia and left bundle branch block.  Not on any AV leona blocking agent    -Avoid any AV leona blocking agent  -Patient is hemodynamically stable despite heart rate in 30–sixties.  No pause noted  -Discussed with daughter on phone in detail about management strategy.  Considering her very advanced age, daughter is somewhat hesitant to consider aggressive management and pacemaker placement.  She is going to discuss with family member regarding this.  For now she would like to continue with observation.  Can consider loop recorder implantation as her presenting symptoms may not be syncope.  We will continue to follow.  Continue telemetry monitoring    Donta Cordero MD        Assessment:     Patient Active Problem List   Diagnosis Code   • GI bleed K92.2   • Nausea with vomiting R11.2   • Syncope R55   •  H&P reviewed. After examining the patient I find no changes in the patients condition since the H&P had been written.     Vitals:    10/18/23 0711   BP: 130/69   Pulse: 58   Resp: 18   Temp: (!) 97.1 °F (36.2 °C)   SpO2: 98% Altered mental status R41.82    DM II (diabetes mellitus, type II), controlled ([facility] E11.9     - Syncope, CT Head No acute intracranial abnormalities, ECG on admission Sinus Nate Pickler with LBBB, no evidence of High degree heart block   - Bradycardia, does not appear to be on any AV leona blocking agents prior to admission     - Cardiomyopathy, ECHO (01/2021) EF 40-45%; ECHO (2016) EF 40%, mild AR   - LBBB (2013)   - Hypothyroidism, on synthroid, TSH 5.34 this admission   - Carotid artery duplex (2014) no evidence of stenosis   - DM2, latest hemoglobin A1c 5.7 (01/2021)   - Hypertension   - HLD   - EEG 2016 consistent with moderate encephalopathy   - Breast Ca hx, left Mastectomy 1969     Plan:     Dr. Shraddha Moore d/w patient's family members at great length of possible pacer placement versus conservative management for symptomatic bradycardia. Will discuss with our EP specialist and family to decide on the next steps of management. Will repeat ECG to r/o high degree AV block   Tele reviewed overnight, HR trending in 30s-40s am/pm   ECHO this admission, was sub optimal b/c of patient compliance, could not appreciate any valvular abnormalities   Avoid any AV leona blocking agents  TSH elevated, will defer this to primary team   UTI, mgmt per primary team   Continue telemetry, will continue to follow      History of Present Illness: This is a 81 y.o. female admitted for Syncope [R55]. Patient complains of:  Syncope, bradycardia       Anastasia Oakes is a 81 y.o. female, PMHx as stated above, who we are seeing in consult for bradycardia. Patient is demented and unable to provide any meaningful history. History was obtained from chart review and speaking with the patient's family, who also are not great historians. Patient was reportedly sitting at the dinner table and \"fell asleep\" twice. Each time it took over 1 minute to wake up. They were unsure if she truly passed out or if she was just sleeping.      Cardiac risk factors: dyslipidemia, diabetes mellitus, hypertension, post-menopausal      Review of Symptoms:  Not done d/t patient' factors, AMS. Past Medical History:     Past Medical History:   Diagnosis Date    [facility]     Diabetes ([facility]     Hypertension     Hypothyroid          Social History:     Social History     Socioeconomic History    Marital status: SINGLE     Spouse name: Not on file    Number of children: Not on file    Years of education: Not on file    Highest education level: Not on file   Tobacco Use    Smoking status: Never Smoker    Smokeless tobacco: Never Used   Substance and Sexual Activity    Alcohol use: No    Drug use: No        Family History:   History reviewed. No pertinent family history.      Medications:   No Known Allergies     Current Facility-Administered Medications   Medication Dose Route Frequency    donepeziL (ARICEPT) tablet 5 mg  5 mg Oral DAILY    levothyroxine (SYNTHROID) tablet 100 mcg  100 mcg Oral ACB    memantine (NAMENDA) tablet 5 mg  5 mg Oral DAILY    atorvastatin (LIPITOR) tablet 10 mg  10 mg Oral QHS    sodium chloride (NS) flush 5-40 mL  5-40 mL IntraVENous Q8H    sodium chloride (NS) flush 5-40 mL  5-40 mL IntraVENous PRN    acetaminophen (TYLENOL) tablet 650 mg  650 mg Oral Q6H PRN    Or    acetaminophen (TYLENOL) suppository 650 mg  650 mg Rectal Q6H PRN    polyethylene glycol (MIRALAX) packet 17 g  17 g Oral DAILY PRN    promethazine (PHENERGAN) tablet 12.5 mg  12.5 mg Oral Q6H PRN    Or    ondansetron (ZOFRAN) injection 4 mg  4 mg IntraVENous Q6H PRN    enoxaparin (LOVENOX) injection 30 mg  30 mg SubCUTAneous DAILY    0.9% sodium chloride infusion  75 mL/hr IntraVENous CONTINUOUS    insulin lispro (HUMALOG) injection   SubCUTAneous AC&HS    glucose chewable tablet 16 g  4 Tab Oral PRN    glucagon (GLUCAGEN) injection 1 mg  1 mg IntraMUSCular PRN    dextrose (D50) infusion 12.5-25 g  25-50 mL IntraVENous PRN    cefTRIAXone (ROCEPHIN) 1 g in 0.9% sodium chloride (MBP/ADV) 50 mL MBP  1 g IntraVENous Q24H         Physical Exam:     Visit Vitals  BP (!) 149/53   Pulse (!) 48   Temp 97.6 °F (36.4 °C)   Resp 14   Ht 5' (1.524 m)   Wt 124 lb (56.2 kg)   SpO2 (!) 66%   BMI 24.22 kg/m²     BP Readings from Last 3 Encounters:   01/28/21 (!) 149/53   11/29/19 124/83   03/21/16 131/50     Pulse Readings from Last 3 Encounters:   01/28/21 (!) 48   11/29/19 66   03/21/16 75     Wt Readings from Last 3 Encounters:   01/27/21 124 lb (56.2 kg)   11/29/19 149 lb 11.2 oz (67.9 kg)   03/21/16 139 lb 12.8 oz (63.4 kg)       General:  cooperative, no distress, appears stated age, confused  Neck:  no JVD appreciated   Lungs:  clear to auscultation bilaterally  Heart:  Bradycardic and regular rhythm, S1, S2 normal, no murmur, click, rub or gallop  Abdomen:  abdomen is soft without significant tenderness, masses, organomegaly or guarding  Extremities:  extremities normal, atraumatic, no cyanosis or edema  Skin: Warm and dry.  no hyperpigmentation, vitiligo, or suspicious lesions  Neuro: alert, oriented x2 - (self and place), affect appropriate,  moves all extremities well  Psych: non focal     Data Review:     Recent Labs     01/28/21  0245 01/26/21  2325   WBC 6.2 7.6   HGB 11.8* 13.0   HCT 37.1 41.0    281     Recent Labs     01/28/21  0245 01/26/21  2236    139   K 3.8 3.8    105   CO2 30 31   GLU 86 143*   BUN 19* 24*   CREA 0.78 1.51*   CA 8.4* 9.2   MG 2.2  --    PHOS 2.8  --    ALB  --  3.5   ALT  --  15       Results for orders placed or performed during the hospital encounter of 01/26/21   EKG, 12 LEAD, INITIAL   Result Value Ref Range    Ventricular Rate 52 BPM    Atrial Rate 52 BPM    P-R Interval 152 ms    QRS Duration 150 ms    Q-T Interval 500 ms    QTC Calculation (Bezet) 465 ms    Calculated P Axis 59 degrees    Calculated R Axis 15 degrees    Calculated T Axis -162 degrees    Diagnosis       Sinus bradycardia  Left bundle branch block  Abnormal ECG  When compared with ECG of 18-MAY-2014 19:31,  No significant change was found  Confirmed by Joshua Anne ([id number]) on 1/27/2021 1:43:16 PM         All Cardiac Markers in the last 24 hours:  No results found for: CPK, CK, CKMMB, CKMB, RCK3, CKMBT, CKNDX, CKND1, JORJE, TROPT, TROIQ, SHARMILA, TROPT, TNIPOC, BNP, BNPP    Last Lipid:    Lab Results   Component Value Date/Time    Cholesterol, total 140 05/20/2014 05:03 AM    HDL Cholesterol 76 (H) 05/20/2014 05:03 AM    LDL, calculated 51.6 05/20/2014 05:03 AM    Triglyceride 62 05/20/2014 05:03 AM    CHOL/HDL Ratio 1.8 05/20/2014 05:03 AM       Signed By: Geovanna Torres PA-C     January 28, 2021

## 2023-10-18 NOTE — ANESTHESIA PREPROCEDURE EVALUATION
Procedure:  SHOULDER ARTHROSCOPIC POSTERIOR LABRUM REPAIR, REMOVAL OF LOOSE BODY (Right: Shoulder)    Relevant Problems   No relevant active problems        Physical Exam    Airway    Mallampati score: I  TM Distance: >3 FB  Neck ROM: full     Dental   No notable dental hx     Cardiovascular      Pulmonary      Other Findings        Anesthesia Plan  ASA Score- 1     Anesthesia Type- general with ASA Monitors. Additional Monitors:     Airway Plan: LMA. Comment: Patient seen and examined. History reviewed. Patient to be done under general anesthesia with LMA and routine monitors. IS block with Exparel to be performed preoperatively for post-op pain. Risks discussed with the patient. Consent obtained. Patient seen and examined. History reviewed. Patient to be done under general anesthesia with LMA and routine monitors. IS block with Exparel to be performed preoperatively for post-op pain. Risks discussed with the patient. Consent obtained. .       Plan Factors-Exercise tolerance (METS): >4 METS. Chart reviewed. Patient summary reviewed. Induction- intravenous. Postoperative Plan- Plan for postoperative opioid use. Informed Consent- Anesthetic plan and risks discussed with patient. I personally reviewed this patient with the CRNA. Discussed and agreed on the Anesthesia Plan with the CRNA. Shira Gilman

## 2023-10-23 ENCOUNTER — OFFICE VISIT (OUTPATIENT)
Dept: OBGYN CLINIC | Facility: OTHER | Age: 28
End: 2023-10-23

## 2023-10-23 VITALS
DIASTOLIC BLOOD PRESSURE: 86 MMHG | RESPIRATION RATE: 18 BRPM | SYSTOLIC BLOOD PRESSURE: 134 MMHG | HEART RATE: 80 BPM | BODY MASS INDEX: 34.86 KG/M2 | HEIGHT: 72 IN

## 2023-10-23 DIAGNOSIS — Z47.89 AFTERCARE FOLLOWING SURGERY OF THE MUSCULOSKELETAL SYSTEM: Primary | ICD-10-CM

## 2023-10-23 PROCEDURE — 99024 POSTOP FOLLOW-UP VISIT: CPT | Performed by: PHYSICIAN ASSISTANT

## 2023-10-23 NOTE — PROGRESS NOTES
Assessment:       1. Aftercare following surgery of the musculoskeletal system          Plan:        Patient is doing well postoperatively. Operative note, images, and posterior labrum repair rehab protocol were discussed. All questions were addressed to the patient's satisfaction. Patient has an appointment with PT. Follow-up will be in 6 weeks to assess patient's progress. Subjective:     Patient ID: Maribell German is a 33 y.o. male. Chief Complaint:    HPI    Patient presents to the office for follow-up status post right shoulder arthroscopy with posterior labrum repair on 10/18/2023. He reports his pain is controlled. He denies any residual paresthesia following anesthetic block. Social History     Occupational History    Not on file   Tobacco Use    Smoking status: Never    Smokeless tobacco: Former     Types: Chew   Vaping Use    Vaping Use: Never used   Substance and Sexual Activity    Alcohol use: Yes     Comment: social use    Drug use: Never    Sexual activity: Not on file      Review of Systems   Constitutional: Negative. Respiratory: Negative. Cardiovascular: Negative. Gastrointestinal: Negative. Musculoskeletal:  Positive for myalgias. Negative for arthralgias. Skin:  Positive for wound. Neurological:  Positive for weakness. Negative for numbness. Psychiatric/Behavioral: Negative. Objective:     Ortho ExamPhysical Exam  HENT:      Head: Atraumatic. Cardiovascular:      Pulses: Normal pulses. Pulmonary:      Effort: Pulmonary effort is normal.   Musculoskeletal:      Comments: Right shoulder range of motion not tested. Skin:     General: Skin is warm and dry. Capillary Refill: Capillary refill takes less than 2 seconds. Comments: Surgical incisions dry and clean. Neurological:      Mental Status: He is alert and oriented to person, place, and time. Sensory: No sensory deficit.    Psychiatric:         Mood and Affect: Mood normal. Behavior: Behavior normal.

## 2023-10-23 NOTE — PATIENT INSTRUCTIONS
1. Sling as per protocol  2. PT per protocol  3.  Follow-up in 6 weeks    Posterior Labral Repair Post-Operative Rehabilitation Protocol  Hanh Scott MD  [facility]  [address] [place] [address]  [phone]  Phase I: Immediate Post-Operative Period (Weeks 0-3 Days 0-21)    Goals: Diminish Inflammation and Pain   Protect Integrity of the Repair   Progress PROM   Become Independent in Modified ADLs    Precautions:   Patient to Remain in Sling at all Times except Dressing/Bathing/PT until Week 3   Avoid Stressing Repair  No Shoulder IR Past Neutral for 6 weeks    Week 0-1 (Days 0-14)   Instruct in Dressing/Bathing/Toileting within Precautions    AROM wrist, hand, fingers    PROM elbow flexion/extension, shoulder supine forward elevation to 90°    Begin Codman Pendulums & Gentle Scapulothoracic Stabilization/Mobilization     Week 2-3 (Days 15-21)   Patient to Remain in Sling except for Exercises   Keep above but add the Following   PROM Shoulder, Flexion and Abduction 0-90°    PROM Shoulder ER with Elbow at the Side (measured relative to scapular plane)     Limit ER to 35°      No IR past 0°          Phase II: Moderate Protection Period (Weeks 3-6)    Goals: Allow Soft Tissue Healing   Gently Progress PROM to Regain Full PROM by Week 6  Except IR, none past Neutral until Week 6   No Glenohumeral Strengthening until Phase III    Precautions:   Sling for Patient Comfort Only   May begin to use Extremity for light ADLs (No Lifting Heavier than Coffee Cup)    Week 3-6 (Days 22-44)   Begin Lower Extremity and Core Strengthening, Light Cardio Training  PROM progressing to Full PROM at Week 6 (including ER with Shoulder in Abduction)   Begin Posterior Capsular Stretching if Necessary (added earlier if release done)   Progress Scapulothoracic Stabilization/Mobilization/Isometrics   If Full PROM Achieved, therapist may add AAROM in all Planes only after Week 4          Phase III: Early Strengthening (Weeks 6-12)    Goals: Gain Full AROM or Maintain if Applicable   Gradual Return of Shoulder/Scapular Strength, Power and Endurance   Prepare for Return to Functional Activities    Precautions:   No Lifting > 10 lbs, Sudden Lifting or Pushing, Overhead Lifting    Week 6-12   Progress Gentle PREs in all Planes of Movement (add Biceps PREs last to protect repair)   Add Light PNF Patterns (Bodyblade/Plyoball/etc. in non-Provocative Positions)  Progressive Rotator Cuff Strengthening   Progress Scapulothoracic Stabilization/Mobilization/Strengthening          Phase IV: Functional Rehabilitation (Weeks 12-16)    Week 12-16   Continue Rotator Cuff and Elbow Flexion strengthening   Emphasize Rhythm and Timing with PNFs (Bodyblade Overhead, Plyoball Throwing)   Stabilize Glenohumeral and Scapulothoracic Joint in Functional Position   Continue Total Body Conditioning (Core, Cardio and Lower Extremity)    Month 4-5   Continue Strengthening/Stabilization/PNF   Begin Interval Throwing Program if ROM/Strength Adequate (Overhead Throwers)    Month 6-7   Begin Throwing from the [place] (50-75%) as Tolerated (If Applicable)    Month 7-9   Progress to Full velocity as Tolerated         NB: This is a general program which may be modified by the surgeon or the therapist in consultation with the surgeon based on intra-operative findings, additional procedures preformed, repair stability, and patient biological factors. If in doubt, please check with my office for individual patient specifics. The ultimate goal of the surgery and rehabilitation is to get the labral tear to heal with the least residual functional deficit of the shoulder due to stiffness.

## 2023-10-25 ENCOUNTER — EVALUATION (OUTPATIENT)
Dept: PHYSICAL THERAPY | Facility: CLINIC | Age: 28
End: 2023-10-25
Payer: COMMERCIAL

## 2023-10-25 DIAGNOSIS — M24.011 LOOSE BODY IN SHOULDER JOINT, RIGHT: ICD-10-CM

## 2023-10-25 DIAGNOSIS — S43.431D LABRAL TEAR OF SHOULDER, RIGHT, SUBSEQUENT ENCOUNTER: Primary | ICD-10-CM

## 2023-10-25 PROCEDURE — 97161 PT EVAL LOW COMPLEX 20 MIN: CPT | Performed by: PHYSICAL THERAPIST

## 2023-10-25 PROCEDURE — 97140 MANUAL THERAPY 1/> REGIONS: CPT | Performed by: PHYSICAL THERAPIST

## 2023-10-25 NOTE — PROGRESS NOTES
PT Evaluation     Today's date: 10/25/2023  Patient name: Maribell German  : 1995  MRN: [id number]  Referring provider: Matthias Lanier*  Dx:   Encounter Diagnosis     ICD-10-CM    1. Labral tear of shoulder, right, subsequent encounter  S43.431D Ambulatory Referral to Physical Therapy      2. Loose body in shoulder joint, right  M24.011 Ambulatory Referral to Physical Therapy                     Assessment  Assessment details: Pt is 28yo male presenting to therapy following Rt posterior labrum repair. Pt is 1 week post-op, rom is decreased, strength not tested due to protocol. Due to injury, pt would benefit from PT services to return to PLOF. Manual prom stretching improved ER rom as well as flexion and abduction. Impairments: abnormal or restricted ROM, activity intolerance, impaired physical strength, lacks appropriate home exercise program and pain with function  Functional limitations: reaching and lifting  Symptom irritability: lowUnderstanding of Dx/Px/POC: good   Prognosis: good    Goals  1. Pt will be independent with HEP upon discharge. 2. Pt will improve Rt shoulder rom to WNL and painfree to reach overhead. 3. Pt will improve Rt shoulder strength to 5/5 to lift without difficulty. 4. Pt will be able complete a push up. 5. Pt will be able lift without pain. Plan  Patient would benefit from: skilled physical therapy  Planned therapy interventions: functional ROM exercises, therapeutic activities, therapeutic exercise, therapeutic training, stretching, strengthening, home exercise program, neuromuscular re-education, manual therapy and patient education  Frequency: 2x week  Duration in weeks: 12  Treatment plan discussed with: patient    Subjective Evaluation    History of Present Illness  Date of surgery: 10/18/2023  Mechanism of injury: Pt had Rt posterior labrum repaired on 10/18/23. Pt had 2 years of PT following a subluxation episode. Pt is 1 week post-op in a sling.  Pt having difficulty with ADL's including dressing ,bathing, lifting. Pt reports only taking advil for pain. Quality of life: good    Patient Goals  Patient goals for therapy: increased strength, independence with ADLs/IADLs, return to sport/leisure activities, decreased pain and increased motion    Pain  Current pain rating: 3  At best pain ratin  At worst pain rating: 3  Location: shoulder incision  Quality: dull ache  Aggravating factors: overhead activity and lifting    Treatments  Previous treatment: physical therapy    Objective     Observations     Right Shoulder  Positive for incision. Additional Observation Details  Incisions covered with steristrips.  No redness,warmth, or bruising noted    Passive Range of Motion   Left Shoulder   Normal passive range of motion    Right Shoulder   Flexion: 80 degrees   Abduction: 80 degrees   External rotation 0°: 0 degrees     Strength/Myotome Testing     Left Shoulder   Normal muscle strength    Additional Strength Details  Rt strength not tested           Precautions: Rt posterior labrum repair 10/18/23; follow protocol      Manuals 10/25            Rt shoulder PROM FH                                                   Neuro Re-Ed             Scap retractions                                                                                           Ther Ex             Pendulums 3'            Elbow ext/flex HEP            Wrist flex/ext HEP                                                                             Ther Activity                                       Gait Training                                       Modalities

## 2023-10-31 ENCOUNTER — OFFICE VISIT (OUTPATIENT)
Dept: PHYSICAL THERAPY | Facility: CLINIC | Age: 28
End: 2023-10-31
Payer: COMMERCIAL

## 2023-10-31 DIAGNOSIS — M24.011 LOOSE BODY IN SHOULDER JOINT, RIGHT: ICD-10-CM

## 2023-10-31 DIAGNOSIS — S43.431D LABRAL TEAR OF SHOULDER, RIGHT, SUBSEQUENT ENCOUNTER: Primary | ICD-10-CM

## 2023-10-31 PROCEDURE — 97140 MANUAL THERAPY 1/> REGIONS: CPT | Performed by: PHYSICAL THERAPIST

## 2023-10-31 NOTE — PROGRESS NOTES
Daily Note     Today's date: 10/31/2023  Patient name: Suzy Dillon  : 1995  MRN: [id number]  Referring provider: Wang Garcia*  Dx:   Encounter Diagnosis     ICD-10-CM    1. Labral tear of shoulder, right, subsequent encounter  S43.431D       2. Loose body in shoulder joint, right  M24.011                      Subjective: Pt reports doing the pendulums in the morning but not needing them for pain. Objective: See treatment diary below      Assessment: Pt is showing improvement with prom, able to get to 90 degrees flexion and abduction. Pt following manual ER was close to 35 degrees as well. Pt educated on protocol. Will continue to progress per protocol. Plan: Continue per plan of care.       Precautions: Rt posterior labrum repair 10/18/23; follow protocol      Manuals 10/25 10/31           Rt shoulder PROM FH FH                                                  Neuro Re-Ed             Scap retractions  10x10''                                                                                         Ther Ex             Pendulums 3' 3'           Elbow ext/flex HEP            Wrist flex/ext HEP                                                                             Ther Activity                                       Gait Training                                       Modalities

## 2023-11-07 ENCOUNTER — OFFICE VISIT (OUTPATIENT)
Dept: PHYSICAL THERAPY | Facility: CLINIC | Age: 28
End: 2023-11-07
Payer: COMMERCIAL

## 2023-11-07 DIAGNOSIS — M24.011 LOOSE BODY IN SHOULDER JOINT, RIGHT: ICD-10-CM

## 2023-11-07 DIAGNOSIS — S43.431D LABRAL TEAR OF SHOULDER, RIGHT, SUBSEQUENT ENCOUNTER: Primary | ICD-10-CM

## 2023-11-07 PROCEDURE — 97140 MANUAL THERAPY 1/> REGIONS: CPT | Performed by: PHYSICAL THERAPIST

## 2023-11-07 PROCEDURE — 97110 THERAPEUTIC EXERCISES: CPT | Performed by: PHYSICAL THERAPIST

## 2023-11-07 NOTE — PROGRESS NOTES
Daily Note     Today's date: 2023  Patient name: Maribell German  : 1995  MRN: [id number]  Referring provider: Matthias Lanier*  Dx:   Encounter Diagnosis     ICD-10-CM    1. Labral tear of shoulder, right, subsequent encounter  S43.431D       2. Loose body in shoulder joint, right  M24.011                      Subjective: Pt reports he had a bee last on his sling/arm and he moved quickly and felt a pop but no pain associated with it. Objective: See treatment diary below      Assessment: Pt showing improved prom in all directions. Added scapular retractions and felt fatigue, added to HEP. Educated pt may walk and bike for cardio if he wants. Continue to progress per protocol, add aarom next week. Plan: Continue per plan of care.       Precautions: Rt posterior labrum repair 10/18/23; follow protocol      Manuals 10/25 10/31 11/7             Week 3          Rt shoulder PROM FH FH FH                                                 Neuro Re-Ed             Scap retractions  10x10'' 10x10''                                                                                        Ther Ex             Pendulums 3' 3' 3'          Elbow ext/flex HEP  30x          Wrist flex/ext HEP                                                                             Ther Activity                                       Gait Training                                       Modalities

## 2023-11-09 ENCOUNTER — OFFICE VISIT (OUTPATIENT)
Dept: PHYSICAL THERAPY | Facility: CLINIC | Age: 28
End: 2023-11-09
Payer: COMMERCIAL

## 2023-11-09 DIAGNOSIS — M24.011 LOOSE BODY IN SHOULDER JOINT, RIGHT: ICD-10-CM

## 2023-11-09 DIAGNOSIS — S43.431D LABRAL TEAR OF SHOULDER, RIGHT, SUBSEQUENT ENCOUNTER: Primary | ICD-10-CM

## 2023-11-09 PROCEDURE — 97140 MANUAL THERAPY 1/> REGIONS: CPT | Performed by: PHYSICAL THERAPIST

## 2023-11-09 PROCEDURE — 97110 THERAPEUTIC EXERCISES: CPT | Performed by: PHYSICAL THERAPIST

## 2023-11-09 NOTE — PROGRESS NOTES
Daily Note     Today's date: 2023  Patient name: Juan Bruner  : 1995  MRN: [id number]  Referring provider: Alisa Cook*  Dx:   Encounter Diagnosis     ICD-10-CM    1. Labral tear of shoulder, right, subsequent encounter  S43.431D       2. Loose body in shoulder joint, right  M24.011                      Subjective: Pt reports shoulder is feeling good, he has some nerve pain but contributes that to neck PMH. Objective: See treatment diary below      Assessment: Pt is showing good improvement with prom. ER near 55 degrees. Forward flexion WFL. Pt will add aarom next week. Continue to progress and add per protocol. Plan: Continue per plan of care.       Precautions: Rt posterior labrum repair 10/18/23; follow protocol      Manuals 10/25 10/31 11/7 11/9            Week 3          Rt shoulder PROM FH FH FH FH                                                Neuro Re-Ed             Scap retractions  10x10'' 10x10''                                                                                        Ther Ex             Pendulums 3' 3' 3' 3'         Elbow ext/flex HEP  30x 30x         Wrist flex/ext HEP            Pulleys             Table Slides                                                    Ther Activity                                       Gait Training                                       Modalities

## 2023-11-14 ENCOUNTER — OFFICE VISIT (OUTPATIENT)
Dept: PHYSICAL THERAPY | Facility: CLINIC | Age: 28
End: 2023-11-14
Payer: COMMERCIAL

## 2023-11-14 DIAGNOSIS — M24.011 LOOSE BODY IN SHOULDER JOINT, RIGHT: ICD-10-CM

## 2023-11-14 DIAGNOSIS — S43.431D LABRAL TEAR OF SHOULDER, RIGHT, SUBSEQUENT ENCOUNTER: Primary | ICD-10-CM

## 2023-11-14 PROCEDURE — 97140 MANUAL THERAPY 1/> REGIONS: CPT | Performed by: PHYSICAL THERAPIST

## 2023-11-14 PROCEDURE — 97110 THERAPEUTIC EXERCISES: CPT | Performed by: PHYSICAL THERAPIST

## 2023-11-14 NOTE — PROGRESS NOTES
Daily Note     Today's date: 2023  Patient name: Leda Schmidt  : 1995  MRN: [id number]  Referring provider: Claduine Wang*  Dx:   Encounter Diagnosis     ICD-10-CM    1. Labral tear of shoulder, right, subsequent encounter  S43.431D       2. Loose body in shoulder joint, right  M24.011           Start Time: 1217  Stop Time: 1243  Total time in clinic (min): 26 minutes    Subjective: Pt reports going without his sling a little more. He feels okay. Still sleeping with it sometimes. Objective: See treatment diary below      Assessment: Pt is progressing very well with prom almost full, slight ER deficit still. Added some aarom today and tolerated well. Continue to progress as able. Plan: Continue per plan of care.       Precautions: Rt posterior labrum repair 10/18/23; follow protocol      Manuals 10/25 10/31 11/7 11/9 11/14           Week 3  Week 4        Rt shoulder PROM FH FH FH FH FH                                               Neuro Re-Ed             Scap retractions  10x10'' 10x10''                                                                                        Ther Ex             Pendulums 3' 3' 3' 3'         Elbow ext/flex HEP  30x 30x 30x        Wrist flex/ext HEP            Pulleys     12x        Table Slides             Shoulder Flexion w/ wand     12x3''        Shoulder ER w/ wand     12x3''        Wall Slides     12x both arms        Ther Activity                                       Gait Training                                       Modalities

## 2023-11-16 ENCOUNTER — OFFICE VISIT (OUTPATIENT)
Dept: PHYSICAL THERAPY | Facility: CLINIC | Age: 28
End: 2023-11-16
Payer: COMMERCIAL

## 2023-11-16 DIAGNOSIS — M24.011 LOOSE BODY IN SHOULDER JOINT, RIGHT: ICD-10-CM

## 2023-11-16 DIAGNOSIS — S43.431D LABRAL TEAR OF SHOULDER, RIGHT, SUBSEQUENT ENCOUNTER: Primary | ICD-10-CM

## 2023-11-16 PROCEDURE — 97110 THERAPEUTIC EXERCISES: CPT | Performed by: PHYSICAL THERAPIST

## 2023-11-16 PROCEDURE — 97140 MANUAL THERAPY 1/> REGIONS: CPT | Performed by: PHYSICAL THERAPIST

## 2023-11-16 NOTE — PROGRESS NOTES
Daily Note     Today's date: 2023  Patient name: Gilbert Lloyd  : 1995  MRN: [id number]  Referring provider: Elvi Terrazas*  Dx:   Encounter Diagnosis     ICD-10-CM    1. Labral tear of shoulder, right, subsequent encounter  S43.431D       2. Loose body in shoulder joint, right  M24.011                      Subjective: Pt reported minimal soreness following last session. Objective: See treatment diary below      Assessment: Continued to progress reps for aarom and tolerated very well. PROM is almost full in all directions. Slight tightness with overhead flexion and ER at 90 of abduction but improves throughout treatment. Continue to progress per protocol. Plan: Continue per plan of care.       Precautions: Rt posterior labrum repair 10/18/23; follow protocol      Manuals 10/25 10/31 11/7 11/9 11/14 11/16          Week 3  Week 4        Rt shoulder PROM FH FH FH FH FH FH                                              Neuro Re-Ed             Scap retractions  10x10'' 10x10'' 10x10'' 10x10'' 10x10''                                                                                     Ther Ex             Pendulums 3' 3' 3' 3'         Elbow ext/flex HEP  30x 30x 30x        Wrist flex/ext HEP            Pulleys     12x 15xea flex, abd                    Shoulder Flexion w/ wand     12x3'' 15x3''       Shoulder ER w/ wand     12x3'' 15x3''       Wall Slides     12x both arms 15xea both arms       Shoulder Isometrics (flex, abd, er)      10x10''       Ther Activity                                       Gait Training                                       Modalities

## 2023-11-20 ENCOUNTER — OFFICE VISIT (OUTPATIENT)
Dept: PHYSICAL THERAPY | Facility: CLINIC | Age: 28
End: 2023-11-20
Payer: COMMERCIAL

## 2023-11-20 DIAGNOSIS — S43.431D LABRAL TEAR OF SHOULDER, RIGHT, SUBSEQUENT ENCOUNTER: Primary | ICD-10-CM

## 2023-11-20 DIAGNOSIS — M24.011 LOOSE BODY IN SHOULDER JOINT, RIGHT: ICD-10-CM

## 2023-11-20 PROCEDURE — 97110 THERAPEUTIC EXERCISES: CPT | Performed by: PHYSICAL THERAPIST

## 2023-11-20 PROCEDURE — 97140 MANUAL THERAPY 1/> REGIONS: CPT | Performed by: PHYSICAL THERAPIST

## 2023-11-20 NOTE — PROGRESS NOTES
Daily Note     Today's date: 2023  Patient name: Oscar Kraft  : 1995  MRN: [id number]  Referring provider: Kelsie Cardozo*  Dx:   Encounter Diagnosis     ICD-10-CM    1. Labral tear of shoulder, right, subsequent encounter  S43.431D       2. Loose body in shoulder joint, right  M24.011                      Subjective: Pt reports no pain. Some clicking with stretching into abduction. Objective: See treatment diary below      Assessment: Pt is tolerating aarom and prom very well. Isometrics also tolerated better than last session. Will progress into arom next session. Plan: Continue per plan of care.       Precautions: Rt posterior labrum repair 10/18/23; follow protocol      Manuals 10/25 10/31 11/7 11/9 11/14 11/16 11/20         Week 3  Week 4  Week 5      Rt shoulder PROM FH FH FH FH FH FH FH                                             Neuro Re-Ed             Scap retractions  10x10'' 10x10'' 10x10'' 10x10'' 10x10'' 10x10''                                                                                    Ther Ex             Pendulums 3' 3' 3' 3'         Elbow ext/flex HEP  30x 30x 30x        Wrist flex/ext HEP            Pulleys     12x 15xea flex, abd 20xea flex, abd                   Shoulder Flexion w/ wand     12x3'' 15x3'' 20x3''      Shoulder ER w/ wand     12x3'' 15x3'' 20x3''      Wall Slides     12x both arms 15xea both arms 20xea 1UE flex, abd      Shoulder Isometrics (flex, abd, er)      10x10'' 10x10''      Ther Activity                                       Gait Training                                       Modalities

## 2023-11-22 ENCOUNTER — OFFICE VISIT (OUTPATIENT)
Dept: PHYSICAL THERAPY | Facility: CLINIC | Age: 28
End: 2023-11-22
Payer: COMMERCIAL

## 2023-11-22 DIAGNOSIS — M24.011 LOOSE BODY IN SHOULDER JOINT, RIGHT: ICD-10-CM

## 2023-11-22 DIAGNOSIS — S43.431D LABRAL TEAR OF SHOULDER, RIGHT, SUBSEQUENT ENCOUNTER: Primary | ICD-10-CM

## 2023-11-22 PROCEDURE — 97110 THERAPEUTIC EXERCISES: CPT | Performed by: PHYSICAL THERAPIST

## 2023-11-22 PROCEDURE — 97140 MANUAL THERAPY 1/> REGIONS: CPT | Performed by: PHYSICAL THERAPIST

## 2023-11-22 NOTE — PROGRESS NOTES
Daily Note     Today's date: 2023  Patient name: Suzy Dillon  : 1995  MRN: [id number]  Referring provider: Wang Garcia*  Dx:   Encounter Diagnosis     ICD-10-CM    1. Labral tear of shoulder, right, subsequent encounter  S43.431D       2. Loose body in shoulder joint, right  M24.011                      Subjective: Pt reports some soreness following last visit. Objective: See treatment diary below      Assessment: Pt tolerating exercise well, started with pulleys which did have some tightness on the superior aspect. Added serratus punches with the wand which was tolerated well. Continue to progress per protocol. Plan: Continue per plan of care.       Precautions: Rt posterior labrum repair 10/18/23; follow protocol      Manuals 10/25 10/31 11/7 11/9 11/14 11/16 11/20 11/22        Week 3  Week 4  Week 5      Rt shoulder PROM FH FH FH FH FH FH FH FH                                            Neuro Re-Ed             Scap retractions  10x10'' 10x10'' 10x10'' 10x10'' 10x10'' 10x10''                                                                                    Ther Ex             Pendulums 3' 3' 3' 3'         Elbow ext/flex HEP  30x 30x 30x        Wrist flex/ext HEP            Pulleys     12x 15xea flex, abd 20xea flex, abd 20xea flex, abd     Serratus Punches        20x10''     Shoulder Flexion w/ wand     12x3'' 15x3'' 20x3'' 20x3''     Shoulder ER w/ wand     12x3'' 15x3'' 20x3'' 20x3''     Wall Slides     12x both arms 15xea both arms 20xea 1UE flex, abd 20xea 1UE flex, abd     Shoulder Isometrics (flex, abd, er)      10x10'' 10x10'' 10x10''     Ther Activity                                       Gait Training                                       Modalities

## 2023-11-28 ENCOUNTER — OFFICE VISIT (OUTPATIENT)
Dept: PHYSICAL THERAPY | Facility: CLINIC | Age: 28
End: 2023-11-28
Payer: COMMERCIAL

## 2023-11-28 DIAGNOSIS — M24.011 LOOSE BODY IN SHOULDER JOINT, RIGHT: ICD-10-CM

## 2023-11-28 DIAGNOSIS — S43.431D LABRAL TEAR OF SHOULDER, RIGHT, SUBSEQUENT ENCOUNTER: Primary | ICD-10-CM

## 2023-11-28 PROCEDURE — 97110 THERAPEUTIC EXERCISES: CPT | Performed by: PHYSICAL THERAPIST

## 2023-11-28 PROCEDURE — 97140 MANUAL THERAPY 1/> REGIONS: CPT | Performed by: PHYSICAL THERAPIST

## 2023-11-28 NOTE — PROGRESS NOTES
Daily Note     Today's date: 2023  Patient name: Harman Sagastume  : 1995  MRN: [id number]  Referring provider: Meredith Thomas*  Dx:   Encounter Diagnosis     ICD-10-CM    1. Labral tear of shoulder, right, subsequent encounter  S43.431D       2. Loose body in shoulder joint, right  M24.011                      Subjective: Pt reports some difficulty with sleeping the past couple days. Objective: See treatment diary below      Assessment: Pt tolerating prom and aarom very well. Added some arom execises which were challenging but tolerated well without pain. Continue to progress as able. Plan: Continue per plan of care.       Precautions: Rt posterior labrum repair 10/18/23; follow protocol      Manuals 10/25 10/31 11/7 11/9 11/14 11/16 11/20 11/22 11/28       Week 3  Week 4  Week 5  Week 6    Rt shoulder PROM FH FH FH FH FH FH FH FH FH                                           Neuro Re-Ed             Scap retractions  10x10'' 10x10'' 10x10'' 10x10'' 10x10'' 10x10''                                                                                    Ther Ex             Pendulums 3' 3' 3' 3'         Elbow ext/flex HEP  30x 30x 30x        Wrist flex/ext HEP            Pulleys     12x 15xea flex, abd 20xea flex, abd 20xea flex, abd 20xea flex, abd    Serratus Punches        20x10''     Shoulder Flexion w/ wand     12x3'' 15x3'' 20x3'' 20x3'' 20x3'' 3#    Sidelying ER         30x    Sidelying ABD         30x    Serratus Punches 3 way         15x5'' ea    Shoulder ER w/ wand     12x3'' 15x3'' 20x3'' 20x3''     Wall Slides     12x both arms 15xea both arms 20xea 1UE flex, abd 20xea 1UE flex, abd 20xea 1UE flex, abd    Shoulder Isometrics (flex, abd, er)      10x10'' 10x10'' 10x10'' 10x10''    Ther Activity                                       Gait Training                                       Modalities

## 2023-11-30 ENCOUNTER — APPOINTMENT (OUTPATIENT)
Dept: PHYSICAL THERAPY | Facility: CLINIC | Age: 28
End: 2023-11-30
Payer: COMMERCIAL

## 2023-12-04 ENCOUNTER — OFFICE VISIT (OUTPATIENT)
Dept: OBGYN CLINIC | Facility: OTHER | Age: 28
End: 2023-12-04

## 2023-12-04 ENCOUNTER — OFFICE VISIT (OUTPATIENT)
Dept: INTERNAL MEDICINE CLINIC | Age: 28
End: 2023-12-04
Payer: COMMERCIAL

## 2023-12-04 VITALS
HEIGHT: 72 IN | WEIGHT: 263 LBS | DIASTOLIC BLOOD PRESSURE: 70 MMHG | BODY MASS INDEX: 35.62 KG/M2 | SYSTOLIC BLOOD PRESSURE: 131 MMHG | HEART RATE: 89 BPM

## 2023-12-04 VITALS
SYSTOLIC BLOOD PRESSURE: 140 MMHG | HEART RATE: 63 BPM | WEIGHT: 259 LBS | DIASTOLIC BLOOD PRESSURE: 80 MMHG | BODY MASS INDEX: 35.08 KG/M2 | OXYGEN SATURATION: 97 % | HEIGHT: 72 IN | TEMPERATURE: 97.4 F

## 2023-12-04 DIAGNOSIS — J30.2 SEASONAL ALLERGIC RHINITIS, UNSPECIFIED TRIGGER: ICD-10-CM

## 2023-12-04 DIAGNOSIS — S43.431D LABRAL TEAR OF SHOULDER, RIGHT, SUBSEQUENT ENCOUNTER: ICD-10-CM

## 2023-12-04 DIAGNOSIS — Z47.89 AFTERCARE FOLLOWING SURGERY OF THE MUSCULOSKELETAL SYSTEM: Primary | ICD-10-CM

## 2023-12-04 DIAGNOSIS — F41.9 ANXIETY: Primary | ICD-10-CM

## 2023-12-04 DIAGNOSIS — Z57.5 OCCUPATIONAL EXPOSURE TO TOXIC AGENTS IN OTHER INDUSTRIES: ICD-10-CM

## 2023-12-04 DIAGNOSIS — S06.9XAD TRAUMATIC BRAIN INJURY, WITH UNKNOWN LOSS OF CONSCIOUSNESS STATUS, SUBSEQUENT ENCOUNTER: ICD-10-CM

## 2023-12-04 PROBLEM — F17.200 NICOTINE DEPENDENCE, UNSPECIFIED, UNCOMPLICATED: Status: RESOLVED | Noted: 2023-12-04 | Resolved: 2023-12-04

## 2023-12-04 PROBLEM — K03.6 DEPOSITS (ACCRETIONS) ON TEETH: Status: RESOLVED | Noted: 2023-12-04 | Resolved: 2023-12-04

## 2023-12-04 PROBLEM — M54.2 NECK PAIN: Status: RESOLVED | Noted: 2023-12-04 | Resolved: 2023-12-04

## 2023-12-04 PROBLEM — M25.511 PAIN IN JOINT OF RIGHT SHOULDER: Status: RESOLVED | Noted: 2023-02-27 | Resolved: 2023-12-04

## 2023-12-04 PROBLEM — M25.311 OTHER INSTABILITY, RIGHT SHOULDER: Status: RESOLVED | Noted: 2023-12-04 | Resolved: 2023-12-04

## 2023-12-04 PROBLEM — S06.9XAA TBI (TRAUMATIC BRAIN INJURY) (HCC): Status: ACTIVE | Noted: 2023-12-04

## 2023-12-04 PROCEDURE — 99024 POSTOP FOLLOW-UP VISIT: CPT | Performed by: PHYSICIAN ASSISTANT

## 2023-12-04 PROCEDURE — 99204 OFFICE O/P NEW MOD 45 MIN: CPT

## 2023-12-04 SDOH — HEALTH STABILITY - PHYSICAL HEALTH: OCCUPATIONAL EXPOSURE TO TOXIC AGENTS IN OTHER INDUSTRIES: Z57.5

## 2023-12-04 NOTE — PROGRESS NOTES
Assessment:       1. Aftercare following surgery of the musculoskeletal system          Plan:        Patient is doing well postoperatively making good progress in PT. The noises" he hears with certain motion movement is likely due to scar tissue breaking up. All questions were addressed/answered to the patient's satisfaction. He should continue posterior labrum repair rehab protocol. Follow-up is in 2 months to assess patient's progress. Subjective:     Patient ID: Juan Worley is a 33 y.o. male. Chief Complaint:    HPI      Patient presents for follow-up 7 weeks status post  on right shoulder arthroscopy with posterior labral repair, 10/18/2023. Pain is controlled. Patient has no residual paresthesia following anesthetic block. Reports hearing noises when he moves his arm certain ways. Social History     Occupational History    Not on file   Tobacco Use    Smoking status: Never    Smokeless tobacco: Former     Types: Chew   Vaping Use    Vaping Use: Never used   Substance and Sexual Activity    Alcohol use: Yes     Comment: social use    Drug use: Never    Sexual activity: Not on file      Review of Systems   Constitutional: Negative. Respiratory: Negative. Musculoskeletal:  Positive for myalgias. Skin:  Negative for wound. Neurological:  Positive for weakness. Negative for numbness. Psychiatric/Behavioral: Negative. Objective:     Ortho ExamPhysical Exam  HENT:      Head: Atraumatic. Cardiovascular:      Pulses: Normal pulses. Skin:     General: Skin is warm and dry. Capillary Refill: Capillary refill takes less than 2 seconds. Comments: Surgical incision dry and clean, healed. Neurological:      Mental Status: He is alert and oriented to person, place, and time. Sensory: No sensory deficit.    Psychiatric:         Mood and Affect: Mood normal.         Behavior: Behavior normal.

## 2023-12-04 NOTE — PROGRESS NOTES
Assessment/Plan:    1. Anxiety  Assessment & Plan:  Stable without medications  Continue with therapy services through [facility]      2. Labral tear of shoulder, right, subsequent encounter  Assessment & Plan:  Surgery 10/18/23, healing well  Continue following with orthopedics      3. Traumatic brain injury, with unknown loss of consciousness status, subsequent encounter  Assessment & Plan:  Hx of Concussions in 2014 and 2016  Hx of TBI in 2019 due to heat stroke  Reports headaches every couple of days- treated with rest or prn ibuprofen  Also notes increased headache frequency since shoulder injury  Offered neurology referral, patient declined at this time      4. 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 Seasonal allergic rhinitis, unspecified trigger  Assessment & Plan:  Continue Zyrtec and Nasonex as needed             Depression Screening and Follow-up Plan: Patient was screened for depression during today's encounter. They screened negative with a PHQ-2 score of 0. [employer] software was used to dictate this note. It may contain errors with dictating incorrect words or incorrect spelling. Please contact the provider directly with any questions. Subjective:      Patient ID: Juan Bruner is a 33 y.o. male. Patient is a 33year old male presenting to [facility] care. He is currently seen by the [facility], who manages his care. He did have recent labs completed, but was unable to obtain results at the time of this visit. He does need to be seen for a physical for [place] [employer] class for private [employer].  Patient recently left [employer] due to shoulder injury      Hx of tobacco use:  Stopped vaping and chewing 9/14, stopped Chantin 10/24  Cravings, but does not dip, vape    Alcohol: 3-4 drinks Friday and Saturday, when in social settings    High exposure to AFFF, Pfast report in blood work at [facility], [facility] following Hx of TBI  He also reports that he had 2 significant concussion, for which she is followed at the [facility] in 2014 in 2016. He reports that his symptoms have resolved, with the exception of some memory issues, for which he damien with using a calender in his phone frequently to help remind him of things. He also reports a TBI in August 2019 due to heat stoke. He notes increased headache frequency which he is managing with rest and prn ibuprofen. Anxiety  Controlled without medications  Follows with therapist at [facility]        The following portions of the patient's history were reviewed and updated as appropriate: allergies, current medications, past family history, past medical history, past social history, past surgical history, and problem list.    Review of Systems   Constitutional:  Negative for chills, fatigue and fever. HENT:  Negative for congestion, ear pain, postnasal drip, rhinorrhea, sinus pressure, sore throat and trouble swallowing. Eyes:  Negative for pain and visual disturbance. Respiratory:  Negative for cough, chest tightness, shortness of breath and wheezing. Cardiovascular:  Negative for chest pain, palpitations and leg swelling. Gastrointestinal:  Negative for abdominal pain, blood in stool, nausea and vomiting. Genitourinary:  Negative for difficulty urinating, dysuria and hematuria. Musculoskeletal:  Negative for arthralgias and back pain. Skin:  Negative for color change, rash and wound. Neurological:  Negative for dizziness, weakness, light-headedness, numbness and headaches. Psychiatric/Behavioral:  Negative for dysphoric mood and sleep disturbance. The patient is nervous/anxious. All other systems reviewed and are negative.         Past Medical History:   Diagnosis Date    Deposits (accretions) on teeth 12/04/2023    Nicotine dependence, unspecified, uncomplicated 27/25/7980    Other instability, right shoulder 12/04/2023    Pain in joint of right shoulder 02/27/2023         Current Outpatient Medications:     cetirizine (ZyrTEC) 10 mg tablet, Take 10 mg by mouth daily, Disp: , Rfl:     ergocalciferol (ERGOCALCIFEROL) 1.25 MG (65316 UT) capsule, TAKE 1 CAPSULE (50,000 UNITS) BY MOUTH ONCE WEEKLY FOR LOW VITAMIN D, Disp: , Rfl:     mometasone (NASONEX) 50 mcg/act nasal spray, 2 sprays into each nostril daily, Disp: , Rfl:     Allergies   Allergen Reactions    Clindamycin Rash    Codeine Rash       Social History   Past Surgical History:   Procedure Laterality Date    VT SURGICAL ARTHROSCOPY SHOULDER CAPSULORRHAPHY Right 10/18/2023    Procedure: SHOULDER ARTHROSCOPIC POSTERIOR LABRUM REPAIR;  Surgeon: Bronwyn Suggs MD;  Location: [facility] OR;  Service: Orthopedics    TONSILLECTOMY  2012    WISDOM TOOTH EXTRACTION  2015     Family History   Problem Relation Age of Onset    Diabetes Mother     Hypertension Father     Cancer Father     Heart disease Father     Alzheimer's disease Maternal Grandmother     Cancer Paternal Grandfather     Heart disease Paternal Grandfather        Objective:  /80 (BP Location: Left arm, Patient Position: Sitting, Cuff Size: Large)   Pulse 63   Temp (!) 97.4 °F (36.3 °C) (Temporal)   Ht 6' (1.829 m)   Wt 117 kg (259 lb)   SpO2 97% Comment: room air  BMI 35.13 kg/m²      Physical Exam  Vitals and nursing note reviewed. Constitutional:       General: He is not in acute distress. Appearance: Normal appearance. He is not ill-appearing. HENT:      Head: Normocephalic and atraumatic. Right Ear: External ear normal.      Left Ear: External ear normal.      Nose: Nose normal.      Mouth/Throat:      Mouth: Mucous membranes are moist.      Pharynx: Oropharynx is clear. No posterior oropharyngeal erythema. Eyes:      General: No scleral icterus. Conjunctiva/sclera: Conjunctivae normal.      Pupils: Pupils are equal, round, and reactive to light.    Cardiovascular:      Rate and Rhythm: Normal rate and regular rhythm. Heart sounds: Normal heart sounds. No murmur heard. No friction rub. No gallop. Pulmonary:      Effort: Pulmonary effort is normal. No respiratory distress. Breath sounds: Normal breath sounds. No wheezing, rhonchi or rales. Chest:      Chest wall: No tenderness. Abdominal:      Palpations: Abdomen is soft. Tenderness: There is no abdominal tenderness. Musculoskeletal:      Cervical back: Normal range of motion and neck supple. No tenderness. Right lower leg: No edema. Left lower leg: No edema. Lymphadenopathy:      Cervical: No cervical adenopathy. Skin:     General: Skin is warm and dry. Neurological:      General: No focal deficit present. Mental Status: He is alert and oriented to person, place, and time. Mental status is at baseline. Cranial Nerves: No cranial nerve deficit.       Coordination: Coordination normal.   Psychiatric:         Mood and Affect: Mood normal.         Behavior: Behavior normal.

## 2023-12-04 NOTE — ASSESSMENT & PLAN NOTE
Hx of Concussions in 2014 and 2016  Hx of TBI in 2019 due to heat stroke  Reports headaches every couple of days- treated with rest or prn ibuprofen  Also notes increased headache frequency since shoulder injury  Offered neurology referral, patient declined at this time

## 2023-12-06 ENCOUNTER — OFFICE VISIT (OUTPATIENT)
Dept: PHYSICAL THERAPY | Facility: CLINIC | Age: 28
End: 2023-12-06
Payer: COMMERCIAL

## 2023-12-06 DIAGNOSIS — M24.011 LOOSE BODY IN SHOULDER JOINT, RIGHT: ICD-10-CM

## 2023-12-06 DIAGNOSIS — S43.431D LABRAL TEAR OF SHOULDER, RIGHT, SUBSEQUENT ENCOUNTER: Primary | ICD-10-CM

## 2023-12-06 PROCEDURE — 97112 NEUROMUSCULAR REEDUCATION: CPT | Performed by: PHYSICAL THERAPIST

## 2023-12-06 PROCEDURE — 97110 THERAPEUTIC EXERCISES: CPT | Performed by: PHYSICAL THERAPIST

## 2023-12-06 NOTE — PROGRESS NOTES
Daily Note     Today's date: 2023  Patient name: Bella Coley  : 1995  MRN: [id number]  Referring provider: Clifford Webber*  Dx:   Encounter Diagnosis     ICD-10-CM    1. Labral tear of shoulder, right, subsequent encounter  S43.431D       2. Loose body in shoulder joint, right  M24.011                      Subjective: Pt reports doing well. Ortho is pleased with his progress. He is following up with them in 2 months. Objective: See treatment diary below      Assessment: Pt is tolerating progressions very well. Scapular and RTC strengthening was progressed and challenging but not pain. Slight tightness at end range ER and overhead. Plan: Continue per plan of care.       Precautions: Rt posterior labrum repair 10/18/23; follow protocol      Manuals 10/25 10/31 11/7 11/9 11/14 11/16 11/20 11/22 11/28 12/6      Week 3  Week 4  Week 5  Week 6 Week 7   Rt shoulder PROM FH FH FH FH FH FH FH FH FH FH                                          Neuro Re-Ed             Scap retractions  10x10'' 10x10'' 10x10'' 10x10'' 10x10'' 10x10''      Rows          2x15 blue   Sh Ext          2x15 blue   I,T,W,Y's prone          15xea                                          Ther Ex             Pendulums 3' 3' 3' 3'         Elbow ext/flex HEP  30x 30x 30x        Wrist flex/ext HEP            Pulleys     12x 15xea flex, abd 20xea flex, abd 20xea flex, abd 20xea flex, abd 20xea flex, abd   Serratus Punches        20x10''     Shoulder Flexion w/ wand     12x3'' 15x3'' 20x3'' 20x3'' 20x3'' 3# 20x3'' 3#   Sidelying ER         30x 30x 2#   Sidelying ABD         30x 30x 1#   Serratus Punches 3 way         15x5'' ea 15x5'' ea 2#   Shoulder ER w/ wand     12x3'' 15x3'' 20x3'' 20x3''     Wall Slides     12x both arms 15xea both arms 20xea 1UE flex, abd 20xea 1UE flex, abd 20xea 1UE flex, abd    Shoulder Isometrics (flex, abd, er)      10x10'' 10x10'' 10x10'' 10x10''    Ther Activity Gait Training                                       Modalities

## 2023-12-08 ENCOUNTER — OFFICE VISIT (OUTPATIENT)
Dept: PHYSICAL THERAPY | Facility: CLINIC | Age: 28
End: 2023-12-08
Payer: COMMERCIAL

## 2023-12-08 DIAGNOSIS — S43.431D LABRAL TEAR OF SHOULDER, RIGHT, SUBSEQUENT ENCOUNTER: Primary | ICD-10-CM

## 2023-12-08 PROCEDURE — 97112 NEUROMUSCULAR REEDUCATION: CPT | Performed by: PHYSICAL THERAPIST

## 2023-12-08 PROCEDURE — 97110 THERAPEUTIC EXERCISES: CPT | Performed by: PHYSICAL THERAPIST

## 2023-12-08 NOTE — PROGRESS NOTES
Daily Note     Today's date: 2023  Patient name: Gilbert Lloyd  : 1995  MRN: [id number]  Referring provider: Elvi Terrazas*  Dx:   Encounter Diagnosis     ICD-10-CM    1. Labral tear of shoulder, right, subsequent encounter  S43.431D                      Subjective: Pt reports no new changes. Objective: See treatment diary below      Assessment: Pt had good tolerance to progression of program. Most discomfort noted during ER. Plan: Continue per plan of care.       Precautions: Rt posterior labrum repair 10/18/23; follow protocol      Manuals     Week 4  Week 5  Week 6 Week 7    Rt shoulder PROM FH FH FH [facility] FH MD                                 Neuro Re-Ed          Scap retractions 10x10'' 10x10'' 10x10''       Rows      2x15 blue 2x15 blue   Sh Ext      2x15 blue 2x15 blue   I,T,W,Y's prone      15xea 15x ea                                 Ther Ex          Pendulums          Elbow ext/flex 30x         Wrist flex/ext          Pulleys 12x 15xea flex, abd 20xea flex, abd 20xea flex, abd 20xea flex, abd 20xea flex, abd 20x ea flex, abd   Tband ER/IR          Shoulder Flexion w/ wand 12x3'' 15x3'' 20x3'' 20x3'' 20x3'' 3# 20x3'' 3# 20x3" 3#   Sidelying ER     30x 30x 2# 20x 3#   Sidelying ABD     30x 30x 1# 30x 1#   Serratus Punches 3 way     15x5'' ea 15x5'' ea 2#    Shoulder ER w/ wand 12x3'' 15x3'' 20x3'' 20x3''      Wall Slides 12x both arms 15xea both arms 20xea 1UE flex, abd 20xea 1UE flex, abd 20xea 1UE flex, abd     Shoulder Isometrics (flex, abd, er)  10x10'' 10x10'' 10x10'' 10x10''     Scap wall slides       x15   Ther Activity          Squigz       Seated at [facility] [place]

## 2023-12-12 ENCOUNTER — OFFICE VISIT (OUTPATIENT)
Dept: PHYSICAL THERAPY | Facility: CLINIC | Age: 28
End: 2023-12-12
Payer: COMMERCIAL

## 2023-12-12 DIAGNOSIS — S43.431D LABRAL TEAR OF SHOULDER, RIGHT, SUBSEQUENT ENCOUNTER: Primary | ICD-10-CM

## 2023-12-12 DIAGNOSIS — M24.011 LOOSE BODY IN SHOULDER JOINT, RIGHT: ICD-10-CM

## 2023-12-12 PROCEDURE — 97110 THERAPEUTIC EXERCISES: CPT | Performed by: PHYSICAL THERAPIST

## 2023-12-12 PROCEDURE — 97112 NEUROMUSCULAR REEDUCATION: CPT | Performed by: PHYSICAL THERAPIST

## 2023-12-12 NOTE — PROGRESS NOTES
Daily Note     Today's date: 2023  Patient name: Tammi Krishna  : 1995  MRN: [id number]  Referring provider: Niya Richards*  Dx:   Encounter Diagnosis     ICD-10-CM    1. Labral tear of shoulder, right, subsequent encounter  S43.431D       2. Loose body in shoulder joint, right  M24.011                      Subjective: Pt reports much less pain with waking up. Able jog without pain. Still clicking noted. Objective: See treatment diary below      Assessment: Progressed some weights today which was fatiguing. Pt is challenged with current POC. Will continue to progress as able. Plan: Continue per plan of care.       Precautions: Rt posterior labrum repair 10/18/23; follow protocol      Manuals     Week 8  Week 5  Week 6 Week 7    Rt shoulder PROM FH [facility] MD                                 Neuro Re-Ed          Scap retractions  10x10'' 10x10''       Rows 2x15 black     2x15 blue 2x15 blue   Sh Ext 2x15 black     2x15 blue 2x15 blue   I,T,W,Y's prone 15xea     15xea 15x ea                                 Ther Ex          Pendulums          Elbow ext/flex          Wrist flex/ext          Pulleys 20x ea flex, abd 15xea flex, abd 20xea flex, abd 20xea flex, abd 20xea flex, abd 20xea flex, abd 20x ea flex, abd   Tband ER/IR          Shoulder Flexion w/ wand 20x3'' 3# 15x3'' 20x3'' 20x3'' 20x3'' 3# 20x3'' 3# 20x3" 3#   Sidelying ER 30x 3#    30x 30x 2# 20x 3#   Sidelying ABD 30x 2#    30x 30x 1# 30x 1#   Serratus Punches 3 way 15x5'' ea 3#    15x5'' ea 15x5'' ea 2#    Shoulder ER w/ wand  15x3'' 20x3'' 20x3''      Wall Slides  15xea both arms 20xea 1UE flex, abd 20xea 1UE flex, abd 20xea 1UE flex, abd     Shoulder Isometrics (flex, abd, er)  10x10'' 10x10'' 10x10'' 10x10''     Scap wall slides 15x red      x15   Ther Activity          Squigz Seated at [facility] all      Seated at [facility]

## 2023-12-14 ENCOUNTER — OFFICE VISIT (OUTPATIENT)
Dept: PHYSICAL THERAPY | Facility: CLINIC | Age: 28
End: 2023-12-14
Payer: COMMERCIAL

## 2023-12-14 DIAGNOSIS — M24.011 LOOSE BODY IN SHOULDER JOINT, RIGHT: ICD-10-CM

## 2023-12-14 DIAGNOSIS — S43.431D LABRAL TEAR OF SHOULDER, RIGHT, SUBSEQUENT ENCOUNTER: Primary | ICD-10-CM

## 2023-12-14 PROCEDURE — 97110 THERAPEUTIC EXERCISES: CPT | Performed by: PHYSICAL THERAPIST

## 2023-12-14 PROCEDURE — 97112 NEUROMUSCULAR REEDUCATION: CPT | Performed by: PHYSICAL THERAPIST

## 2023-12-14 NOTE — PROGRESS NOTES
Daily Note     Today's date: 2023  Patient name: Randolph Clark  : 1995  MRN: [id number]  Referring provider: Marci Welch*  Dx:   Encounter Diagnosis     ICD-10-CM    1. Labral tear of shoulder, right, subsequent encounter  S43.431D       2. Loose body in shoulder joint, right  M24.011                      Subjective: Pt reports shoulder is good, no new complaints. Objective: See treatment diary below      Assessment: Pt tolerating increase in strengthening and stability well. Pt with slight tightness overhead and ER with prom and arom. Continue to progress as tolerable. Plan: Continue per plan of care.       Precautions: Rt posterior labrum repair 10/18/23; follow protocol      Manuals     Week 8    Week 6 Week 7    Rt shoulder PROM FH FH  [facility] MD                                 Neuro Re-Ed          Scap retractions          Rows 2x15 black 2x15 black    2x15 blue 2x15 blue   Sh Ext 2x15 black 2x15 black    2x15 blue 2x15 blue   I,T,W,Y's prone 15xea 15xea     15xea 15x ea                                 Ther Ex          Prayer stretch  3x15''        Elbow ext/flex          Wrist flex/ext          Pulleys 20x ea flex, abd 20x ea flex, abd  20xea flex, abd 20xea flex, abd 20xea flex, abd 20x ea flex, abd   Tband ER/IR          Shoulder Flexion w/ wand 20x3'' 3# 20x3'' 3#  20x3'' 20x3'' 3# 20x3'' 3# 20x3" 3#   Sidelying ER 30x 3# 30x 3#   30x 30x 2# 20x 3#   Sidelying ABD 30x 2# 30x 3#   30x 30x 1# 30x 1#   Serratus Punches 3 way 15x5'' ea 3# 15x5'' 5#   15x5'' ea 15x5'' ea 2#    Shoulder ER w/ wand    20x3''      Wall Slides    20xea 1UE flex, abd 20xea 1UE flex, abd     Shoulder Isometrics (flex, abd, er)    10x10'' 10x10''     Mat Push up plus  10x        Scap wall slides 15x red 15x red     x15   Ther Activity          Squigz Seated at [facility] all      Seated at [facility] [place]

## 2023-12-18 ENCOUNTER — OFFICE VISIT (OUTPATIENT)
Dept: INTERNAL MEDICINE CLINIC | Age: 28
End: 2023-12-18
Payer: COMMERCIAL

## 2023-12-18 VITALS
HEART RATE: 75 BPM | BODY MASS INDEX: 35.76 KG/M2 | TEMPERATURE: 97.5 F | WEIGHT: 264 LBS | DIASTOLIC BLOOD PRESSURE: 80 MMHG | OXYGEN SATURATION: 97 % | SYSTOLIC BLOOD PRESSURE: 118 MMHG | HEIGHT: 72 IN

## 2023-12-18 DIAGNOSIS — Z00.00 ANNUAL PHYSICAL EXAM: Primary | ICD-10-CM

## 2023-12-18 PROCEDURE — 99395 PREV VISIT EST AGE 18-39: CPT | Performed by: INTERNAL MEDICINE

## 2023-12-18 RX ORDER — VITAMIN B COMPLEX
1000 TABLET ORAL DAILY
COMMUNITY
Start: 2023-07-17

## 2023-12-18 NOTE — PROGRESS NOTES
Assessment/Plan:    Annual physical examination  - History and physical examination done  - Pt was counseled to eat a heart healthy diet, to drink at least 2 L of water daily, to take a daily multivitamin and to exercise for at least 30 minutes of cardio exercise daily, for at least 5 days a week.  - CBC, CMP, TSH and lipid panel have been ordered and we will follow-up with the results.  -He is up-to-date with 1 J&J COVID-vaccine, Tdap and did not take the flu shot.  - follow up as scheduled with PCP.      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     Diagnoses and all orders for this visit:    Annual physical exam  -     CBC and differential; Future  -     TSH, 3rd generation with Free T4 reflex; Future  -     Comprehensive metabolic panel; Future  -     Lipid panel; Future    BMI 35.0-35.9,adult    Other orders  -     cholecalciferol (VITAMIN D3) 25 mcg (1,000 units) tablet; Take 1,000 Units by mouth daily          BMI Counseling: Body mass index is 35.8 kg/m². The BMI is above normal. Nutrition recommendations include consuming healthier snacks, limiting drinks that contain sugar, reducing intake of saturated and trans fat and reducing intake of cholesterol. Exercise recommendations include moderate physical activity 150 minutes/week. No pharmacotherapy was ordered. Patient referred to PCP. Rationale for BMI follow-up plan is due to patient being overweight or obese.       Subjective:      Patient ID: Jackson Moreira is a 28 y.o. male.    HPI    Patient presents for an annual physical exam.    Last annual physical exam- July 2023, [facility] and not [facility]     Past medical history- seasonal allergies, TBI when he fell during a heat stroke - 2019,  nicotine dependence, vit d def    Past surgical history- R shoulder sx for labral tear,  tonsillectomy    Medications- see list     Allergies- see list    Diet-  mixture of balanced and junk food, drinks about half to one gal of water daily    Exercise- running for the past week( was just cleared to exercise last week) - has not been cleared to exercise with no wt restriction on his R shoulder by orthopedic sx and is R hand dominant     Alcohol use- socially - < 10 drinks a week    Caffeine and soda use- 1 cup of coffee every 2 days     Nicotine use- quit smoking 4 months ago, chewed tobacco for about 10 years and vaped for 2     Recreational drug use-  none    Work- medically discharged     Sexual history, STD history and HIV testing- monogamous with female partner, std hx - never, hiv testing - done in the      Gynecological history/Prostate health/testicular health history- testicular self exam discussed     Colonoscopy- n/a    Immunization history-  up to date with the j and j covid vaccine, tdap - 2019, no flu shot    Dental visit- every 6 months     Vision- myopia - glasses    Family history-  Htn - dad  Dm - mom  MI - PGF  CVA - PGF  Thymic ca - dad  Throat ca - PGF  Alzheimer dementia - MGM    Today, patient states that the only time he had a loss of consciousness was when 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  He has documented traumatic brain injury in his chart but tells me that the only reason he has that in his history is because he fell and had head strike when he had his heatstroke.  However, a review of his medical records shows that at age 18, he had trauma to his right mastoid area with subsequent dizziness, right-sided tinnitus and headache but CT scan and MRI of the head done after his trauma were both negative.  He denies any history of seizure disorder, psychosis, bipolar disorder, posttraumatic stress disorder and chronic pain syndrome.  He also admits to occasional feelings of anxiety, occasional insomnia and arthralgia in his knees but denies  any fever, chills,  night sweats, headache, dizziness, nasal congestion, runny nose, pnd, sore throat, ear ache, sinus pain or pressure, wheezing, cough, chest pain, sob, palpitations, nausea, vomiting, diarrhea, constipation, hematochezia, hematuria, melena stools,  myalgias, feelings of depression.      The following portions of the patient's history were reviewed and updated as appropriate: He  has a past medical history of Deposits (accretions) on teeth (12/04/2023), Nicotine dependence, unspecified, uncomplicated (12/04/2023), Other instability, right shoulder (12/04/2023), and Pain in joint of right shoulder (02/27/2023).  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  His family history includes Alzheimer's disease in his maternal grandmother; Cancer in his father and paternal grandfather; Diabetes in his mother; Heart disease in his father and paternal grandfather; Hypertension in his father.  He  reports that he has never smoked. He quit smokeless tobacco use about 3 months ago.  His smokeless tobacco use included chew. He reports current  alcohol use. He reports that he does not use drugs.  Current Outpatient Medications   Medication Sig Dispense Refill   • cetirizine (ZyrTEC) 10 mg tablet Take 10 mg by mouth daily     • cholecalciferol (VITAMIN D3) 25 mcg (1,000 units) tablet Take 1,000 Units by mouth daily     • mometasone (NASONEX) 50 mcg/act nasal spray 2 sprays into each nostril daily     • ergocalciferol (ERGOCALCIFEROL) 1.25 MG (31476 UT) capsule TAKE 1 CAPSULE (50,000 UNITS) BY MOUTH ONCE WEEKLY FOR LOW VITAMIN D       No current facility-administered medications for this visit.     Current Outpatient Medications on File Prior to Visit   Medication Sig   • cetirizine (ZyrTEC) 10 mg tablet Take 10 mg by mouth daily   • cholecalciferol (VITAMIN D3) 25 mcg (1,000 units) tablet Take 1,000 Units by mouth daily   • mometasone (NASONEX) 50 mcg/act nasal spray 2 sprays into each nostril daily   • ergocalciferol (ERGOCALCIFEROL) 1.25 MG (73298 UT) capsule TAKE 1 CAPSULE (50,000 UNITS) BY MOUTH ONCE WEEKLY FOR LOW VITAMIN D     No current facility-administered medications on file prior to visit.     He is allergic to clindamycin and codeine..    Review of Systems   Constitutional:  Negative for activity change, chills, fatigue, fever and unexpected weight change.   HENT:  Negative for ear pain, postnasal drip, rhinorrhea, sinus pressure and sore throat.    Eyes:  Negative for pain.   Respiratory:  Negative for cough, choking, chest tightness, shortness of breath and wheezing.    Cardiovascular:  Negative for chest pain, palpitations and leg swelling.   Gastrointestinal:  Negative for abdominal pain, constipation, diarrhea, nausea and vomiting.   Genitourinary:  Negative for dysuria and hematuria.   Musculoskeletal:  Positive for arthralgias (knee aches). Negative for back pain, gait problem, joint swelling, myalgias and neck stiffness.   Skin:  Negative for pallor and rash.   Neurological:  Negative for dizziness, tremors, seizures, syncope,  light-headedness and headaches.   Hematological:  Negative for adenopathy.   Psychiatric/Behavioral:  Positive for sleep disturbance (occasionally). Negative for behavioral problems and dysphoric mood. The patient is nervous/anxious.          Objective:      /80 (BP Location: Left arm, Patient Position: Sitting, Cuff Size: Large)   Pulse 75   Temp 97.5 °F (36.4 °C) (Temporal)   Ht 6' (1.829 m)   Wt 120 kg (264 lb)   SpO2 97% Comment: room air  BMI 35.80 kg/m²          Physical Exam  Constitutional:       General: He is not in acute distress.     Appearance: He is well-developed. He is not diaphoretic.   HENT:      Head: Normocephalic and atraumatic.      Right Ear: External ear normal.      Left Ear: External ear normal.      Nose: Nose normal.      Mouth/Throat:      Pharynx: No oropharyngeal exudate.   Eyes:      General: No scleral icterus.        Right eye: No discharge.         Left eye: No discharge.      Conjunctiva/sclera: Conjunctivae normal.      Pupils: Pupils are equal, round, and reactive to light.   Neck:      Thyroid: No thyromegaly.      Vascular: No JVD.      Trachea: No tracheal deviation.   Cardiovascular:      Rate and Rhythm: Normal rate and regular rhythm.      Heart sounds: Normal heart sounds. No murmur heard.     No friction rub. No gallop.   Pulmonary:      Effort: Pulmonary effort is normal. No respiratory distress.      Breath sounds: Normal breath sounds. No wheezing or rales.   Chest:      Chest wall: No tenderness.   Abdominal:      General: Bowel sounds are normal. There is no distension.      Palpations: Abdomen is soft. There is no mass.      Tenderness: There is no abdominal tenderness. There is no guarding or rebound.   Musculoskeletal:         General: No tenderness or deformity. Normal range of motion.      Cervical back: Normal range of motion and neck supple.   Lymphadenopathy:      Cervical: No cervical adenopathy.   Skin:     General: Skin is warm and dry.       Coloration: Skin is not pale.      Findings: No erythema or rash.   Neurological:      Mental Status: He is alert and oriented to person, place, and time.      Cranial Nerves: No cranial nerve deficit.      Motor: No abnormal muscle tone.      Coordination: Coordination normal.      Deep Tendon Reflexes: Reflexes are normal and symmetric.   Psychiatric:         Behavior: Behavior normal.           No visits with results within 12 Month(s) from this visit.   Latest known visit with results is:   No results found for any previous visit.

## 2023-12-19 ENCOUNTER — EVALUATION (OUTPATIENT)
Dept: PHYSICAL THERAPY | Facility: CLINIC | Age: 28
End: 2023-12-19
Payer: COMMERCIAL

## 2023-12-19 DIAGNOSIS — M24.011 LOOSE BODY IN SHOULDER JOINT, RIGHT: ICD-10-CM

## 2023-12-19 DIAGNOSIS — S43.431D LABRAL TEAR OF SHOULDER, RIGHT, SUBSEQUENT ENCOUNTER: Primary | ICD-10-CM

## 2023-12-19 PROCEDURE — 97110 THERAPEUTIC EXERCISES: CPT | Performed by: PHYSICAL THERAPIST

## 2023-12-19 PROCEDURE — 97112 NEUROMUSCULAR REEDUCATION: CPT | Performed by: PHYSICAL THERAPIST

## 2023-12-19 NOTE — PROGRESS NOTES
PT Re-Evaluation     Today's date: 2023  Patient name: Jackson Moreira  : 1995  MRN: [id number]  Referring provider: Michael Matos*  Dx:   Encounter Diagnosis     ICD-10-CM    1. Labral tear of shoulder, right, subsequent encounter  S43.431D       2. Loose body in shoulder joint, right  M24.011                      Assessment  Assessment details: Pt is 27yo male presenting to therapy following Rt posterior labrum repair. Pt has shown good improvement with rom and strength. ER strength and rom is the most limited. Due to injury, pt would continue to benefit from PT services to return to PLOF.   Impairments: abnormal or restricted ROM, activity intolerance, impaired physical strength, lacks appropriate home exercise program and pain with function  Functional limitations: reaching and lifting  Symptom irritability: lowUnderstanding of Dx/Px/POC: good   Prognosis: good    Goals  1. Pt will be independent with HEP upon discharge.  2. Pt will improve Rt shoulder rom to WNL and painfree to reach overhead. Met  3. Pt will improve Rt shoulder strength to 5/5 to lift without difficulty. Progressing  4. Pt will be able complete a push up. Progressing  5. Pt will be able lift without pain. Progressing    Plan  Patient would benefit from: skilled physical therapy  Planned therapy interventions: functional ROM exercises, therapeutic activities, therapeutic exercise, therapeutic training, stretching, strengthening, home exercise program, neuromuscular re-education, manual therapy and patient education  Frequency: 2x week  Duration in weeks: 8  Treatment plan discussed with: patient    Subjective Evaluation    History of Present Illness  Date of surgery: 10/18/2023  Mechanism of injury: Pt had Rt posterior labrum repaired on 10/18/23. Pt had 2 years of PT following a subluxation episode. Pt is 1 week post-op in a sling. Pt having difficulty with ADL's including dressing ,bathing, lifting. Pt reports only  "taking advil for pain.  Quality of life: good    Patient Goals  Patient goals for therapy: increased strength, independence with ADLs/IADLs, return to sport/leisure activities, decreased pain and increased motion    Pain  Current pain ratin  At best pain ratin  At worst pain ratin  Location: shoulder incision  Quality: sharp  Aggravating factors: ER functional    Treatments  Previous treatment: physical therapy    Objective     Observations     Right Shoulder  Positive for incision.     Additional Observation Details  Incisions covered with steristrips. No redness,warmth, or bruising noted    Passive Range of Motion   Left Shoulder   Normal passive range of motion    Right Shoulder   Flexion: 155 degrees   Abduction: 155 degrees   External rotation 0°: 60 degrees     Strength/Myotome Testing     Left Shoulder   Normal muscle strength    Additional Strength Details  Flexion: 4/5  Abduction: 4/5  External Rotation: 4-/5  Internal Rotation: 4/5       Precautions: Rt posterior labrum repair 10/18/23; follow protocol      Manuals  12    Week 8     Week 7    Rt shoulder PROM FH [facility] MD                                 Neuro Re-Ed          Scap retractions          Rows 2x15 black 2x15 black 2x15 black   2x15 blue 2x15 blue   Sh Ext 2x15 black 2x15 black 2x15 black   2x15 blue 2x15 blue   I,T,W,Y's prone 15xea 15xea  15xea 2#   15xea 15x ea   Row+ER+ Press                              Ther Ex          Prayer stretch  3x15'' 3x15''       Elbow ext/flex          Wrist flex/ext          Pulleys 20x ea flex, abd 20x ea flex, abd 20x ea flex, abd   20xea flex, abd 20x ea flex, abd   Tband ER/IR          Shoulder Flexion w/ wand 20x3'' 3# 20x3'' 3#    20x3'' 3# 20x3\" 3#   Sidelying ER 30x 3# 30x 3# 30x 3#   30x 2# 20x 3#   Sidelying ABD 30x 2# 30x 3# 30x 3#   30x 1# 30x 1#   Serratus Punches 3 way 15x5'' ea 3# 15x5'' 5# 15x5'' 5/10#   15x5'' ea 2#    Shoulder ER w/ wand          Wall " Slides          Mat Scap Walks   5 laps red       Mat Push up plus  10x 10x       Scap wall slides 15x red 15x red 15x red    x15   Ther Activity          Squigz Seated at Mirror all      Seated at Mirror all   Bodyblade   10xeaflex, abd

## 2023-12-21 ENCOUNTER — OFFICE VISIT (OUTPATIENT)
Dept: PHYSICAL THERAPY | Facility: CLINIC | Age: 28
End: 2023-12-21
Payer: COMMERCIAL

## 2023-12-21 DIAGNOSIS — M24.011 LOOSE BODY IN SHOULDER JOINT, RIGHT: ICD-10-CM

## 2023-12-21 DIAGNOSIS — S43.431D LABRAL TEAR OF SHOULDER, RIGHT, SUBSEQUENT ENCOUNTER: Primary | ICD-10-CM

## 2023-12-21 PROCEDURE — 97112 NEUROMUSCULAR REEDUCATION: CPT | Performed by: PHYSICAL THERAPIST

## 2023-12-21 PROCEDURE — 97110 THERAPEUTIC EXERCISES: CPT | Performed by: PHYSICAL THERAPIST

## 2023-12-21 NOTE — PROGRESS NOTES
"Daily Note     Today's date: 2023  Patient name: Jackson Moreira  : 1995  MRN: [id number]  Referring provider: Michael Matos*  Dx:   Encounter Diagnosis     ICD-10-CM    1. Labral tear of shoulder, right, subsequent encounter  S43.431D       2. Loose body in shoulder joint, right  M24.011                      Subjective: Pt reports feeling good.      Objective: See treatment diary below      Assessment: Pt reports feeling good throughout session. Pt did had a pop during manual stretching that caused some guarding following so limited the manual stretching some today. Pt tolerated all strengthening very well but reported fatigue throughout.      Plan: Continue per plan of care.      Precautions: Rt posterior labrum repair 10/18/23; follow protocol      Manuals     Week 8     Week 7    Rt shoulder PROM FH [facility] MD                                 Neuro Re-Ed          Scap retractions          Rows 2x15 black 2x15 black 2x15 black   2x15 blue 2x15 blue   Sh Ext 2x15 black 2x15 black 2x15 black   2x15 blue 2x15 blue   I,T,W,Y's prone 15xea 15xea  15xea 2# 15xea 2#  15xea 15x ea   Row+ER+ Press    2x10 red                          Ther Ex          Prayer stretch  3x15'' 3x15'' 3x15''      Elbow ext/flex          Wrist flex/ext          Pulleys 20x ea flex, abd 20x ea flex, abd 20x ea flex, abd   20xea flex, abd 20x ea flex, abd   Tband ER/IR          Shoulder Flexion w/ wand 20x3'' 3# 20x3'' 3#    20x3'' 3# 20x3\" 3#   Sidelying ER 30x 3# 30x 3# 30x 3# 30x 3#  30x 2# 20x 3#   Sidelying ABD 30x 2# 30x 3# 30x 3# 30x 3#  30x 1# 30x 1#   Serratus Punches 3 way 15x5'' ea 3# 15x5'' 5# 15x5'' 5/10# 15x5'' 8/10#  15x5'' ea 2#    Shoulder ER w/ wand          Wall Slides          Mat Scap Walks   5 laps red 5 laps red      Mat Push up plus  10x 10x 10x      Scap wall slides 15x red 15x red 15x red 15x red   x15   UBE    3'/3'      Ther Activity          Ba Seated at " Mirror all      Seated at Mirror all   Bodyblade   10xeaflex, abd 10xeaflex, abd

## 2023-12-26 ENCOUNTER — OFFICE VISIT (OUTPATIENT)
Dept: PHYSICAL THERAPY | Facility: CLINIC | Age: 28
End: 2023-12-26
Payer: COMMERCIAL

## 2023-12-26 DIAGNOSIS — S43.431D LABRAL TEAR OF SHOULDER, RIGHT, SUBSEQUENT ENCOUNTER: Primary | ICD-10-CM

## 2023-12-26 DIAGNOSIS — M24.011 LOOSE BODY IN SHOULDER JOINT, RIGHT: ICD-10-CM

## 2023-12-26 PROCEDURE — 97110 THERAPEUTIC EXERCISES: CPT | Performed by: PHYSICAL THERAPIST

## 2023-12-26 PROCEDURE — 97112 NEUROMUSCULAR REEDUCATION: CPT | Performed by: PHYSICAL THERAPIST

## 2023-12-26 NOTE — PROGRESS NOTES
Daily Note     Today's date: 2023  Patient name: Jackson Moreira  : 1995  MRN: [id number]  Referring provider: Michael Matos*  Dx:   Encounter Diagnosis     ICD-10-CM    1. Labral tear of shoulder, right, subsequent encounter  S43.431D       2. Loose body in shoulder joint, right  M24.011                      Subjective: Pt reports his arm is pretty sore today possibly from sleeping on it.      Objective: See treatment diary below      Assessment: Pt is tolerating strengthening very well. Lats continue to limited end range rom slightly. Educated to continue with lat stretch for HEP and will continue to progress overhead strenthening. Progress to floor planks and pushups.      Plan: Continue per plan of care.      Precautions: Rt posterior labrum repair 10/18/23; follow protocol      Manuals       Week 8         Rt shoulder PROM FH FH FH FH FH                                   Neuro Re-Ed          Scap retractions          Rows 2x15 black 2x15 black 2x15 black       Sh Ext 2x15 black 2x15 black 2x15 black       I,T,W,Y's prone 15xea 15xea  15xea 2# 15xea 2# 15xea 3#     Row+ER+ Press    2x10 red 2x10 red                         Ther Ex          Prayer stretch  3x15'' 3x15'' 3x15'' 3x15''     Elbow ext/flex          Wrist flex/ext          Pulleys 20x ea flex, abd 20x ea flex, abd 20x ea flex, abd       Tband ER/IR          Shoulder Flexion w/ wand 20x3'' 3# 20x3'' 3#        Sidelying ER 30x 3# 30x 3# 30x 3# 30x 3# 30x 3#     Sidelying ABD 30x 2# 30x 3# 30x 3# 30x 3# 30x 3#     Serratus Punches 3 way 15x5'' ea 3# 15x5'' 5# 15x5'' 510# 15x5'' 8/10# 15x5'' 8/10#     Shoulder ER w/ wand          Wall Slides          Mat Scap Walks   5 laps red 5 laps red 5 laps red     Mat Push up plus  10x 10x 10x 20x     Scap wall slides 15x red 15x red 15x red 15x red 20x red     UBE    3'/3' 3'/3'     Ther Activity          Squigz Seated at Mirror all         Bodyblade   10xeaflex,  abd 10xeaflex, abd 10xeaflex, abd     Shoulder Press     2x10 8#

## 2023-12-28 ENCOUNTER — OFFICE VISIT (OUTPATIENT)
Dept: PHYSICAL THERAPY | Facility: CLINIC | Age: 28
End: 2023-12-28
Payer: COMMERCIAL

## 2023-12-28 DIAGNOSIS — S43.431D LABRAL TEAR OF SHOULDER, RIGHT, SUBSEQUENT ENCOUNTER: Primary | ICD-10-CM

## 2023-12-28 DIAGNOSIS — M24.011 LOOSE BODY IN SHOULDER JOINT, RIGHT: ICD-10-CM

## 2023-12-28 PROCEDURE — 97112 NEUROMUSCULAR REEDUCATION: CPT | Performed by: PHYSICAL THERAPIST

## 2023-12-28 PROCEDURE — 97110 THERAPEUTIC EXERCISES: CPT | Performed by: PHYSICAL THERAPIST

## 2023-12-28 NOTE — PROGRESS NOTES
Daily Note     Today's date: 2023  Patient name: Jackson Moreira  : 1995  MRN: [id number]  Referring provider: Michael Matos*  Dx:   Encounter Diagnosis     ICD-10-CM    1. Labral tear of shoulder, right, subsequent encounter  S43.431D       2. Loose body in shoulder joint, right  M24.011                      Subjective: Pt reports shoulder is doing well.      Objective: See treatment diary below      Assessment: Pt is tolerating strengthening very well. Progressed overhead weight to 12#. Pt continues to report fatigue at the end of session. Will continue to progress as able.      Plan: Continue per plan of care.      Precautions: Rt posterior labrum repair 10/18/23; follow protocol      Manuals      Week 8         Rt shoulder PROM FH FH FH FH FH FH                                  Neuro Re-Ed          Scap retractions          Rows 2x15 black 2x15 black 2x15 black       Sh Ext 2x15 black 2x15 black 2x15 black       I,T,W,Y's prone 15xea 15xea  15xea 2# 15xea 2# 15xea 3# 15xea 3#    Row+ER+ Press    2x10 red 2x10 red 2x10 red    Plank Sh Taps      2x10              Ther Ex          Prayer stretch  3x15'' 3x15'' 3x15'' 3x15'' 3x15''    Elbow ext/flex          Wrist flex/ext          Pulleys 20x ea flex, abd 20x ea flex, abd 20x ea flex, abd       Tband ER/IR          Shoulder Flexion w/ wand 20x3'' 3# 20x3'' 3#        Sidelying ER 30x 3# 30x 3# 30x 3# 30x 3# 30x 3# 30x 4#    Sidelying ABD 30x 2# 30x 3# 30x 3# 30x 3# 30x 3# 30x 4#    Serratus Punches 3 way 15x5'' ea 3# 15x5'' 5# 15x5'' 5/10# 15x5'' 8/10# 15x5'' 8/10# 15x5'' 8/10#    Shoulder ER w/ wand          Wall Slides          Mat Scap Walks   5 laps red 5 laps red 5 laps red 5 laps red    Mat Push up plus  10x 10x 10x 20x 20x    Scap wall slides 15x red 15x red 15x red 15x red 20x red 20x red    UBE    3'/3' 3'/3'               Ther Activity          Squigz Seated at Mirror all         Bodyblade    10xeaflex, abd 10xeaflex, abd 10xeaflex, abd 10xeaflex, abd    Shoulder Press     2x10 8# 2x10 12#

## 2024-01-02 ENCOUNTER — OFFICE VISIT (OUTPATIENT)
Dept: PHYSICAL THERAPY | Facility: CLINIC | Age: 29
End: 2024-01-02
Payer: COMMERCIAL

## 2024-01-02 DIAGNOSIS — S43.431D LABRAL TEAR OF SHOULDER, RIGHT, SUBSEQUENT ENCOUNTER: Primary | ICD-10-CM

## 2024-01-02 DIAGNOSIS — M24.011 LOOSE BODY IN SHOULDER JOINT, RIGHT: ICD-10-CM

## 2024-01-02 PROCEDURE — 97110 THERAPEUTIC EXERCISES: CPT | Performed by: PHYSICAL THERAPIST

## 2024-01-02 PROCEDURE — 97112 NEUROMUSCULAR REEDUCATION: CPT | Performed by: PHYSICAL THERAPIST

## 2024-01-02 NOTE — PROGRESS NOTES
Daily Note     Today's date: 2024  Patient name: Jackson Moreira  : 1995  MRN: [id number]  Referring provider: Michael Matos*  Dx:   Encounter Diagnosis     ICD-10-CM    1. Labral tear of shoulder, right, subsequent encounter  S43.431D       2. Loose body in shoulder joint, right  M24.011                      Subjective: Pt reports no changes since last visit.      Objective: See treatment diary below      Assessment: Progressed to floor push ups and was only fatigued. Pt has slight tightness with overhead and er. Pt is progressing very well. Will continue to progress as able.      Plan: Continue per plan of care.      Precautions: Rt posterior labrum repair 10/18/23; follow protocol      Manuals     Week 8         Rt shoulder PROM FH FH FH FH FH FH FH                                 Neuro Re-Ed          Scap retractions          Rows 2x15 black 2x15 black 2x15 black       Sh Ext 2x15 black 2x15 black 2x15 black       I,T,W,Y's prone 15xea 15xea  15xea 2# 15xea 2# 15xea 3# 15xea 3# 15xea 4#   Row+ER+ Press    2x10 red 2x10 red 2x10 red 2x10 green   Plank Sh Taps      2x10    Bosu clocks       20x lat   Ther Ex          Prayer stretch  3x15'' 3x15'' 3x15'' 3x15'' 3x15'' 3x15''   Elbow ext/flex          Wrist flex/ext          Pulleys 20x ea flex, abd 20x ea flex, abd 20x ea flex, abd       Tband ER/IR          Shoulder Flexion w/ wand 20x3'' 3# 20x3'' 3#        Sidelying ER 30x 3# 30x 3# 30x 3# 30x 3# 30x 3# 30x 4# 30x 4#   Sidelying ABD 30x 2# 30x 3# 30x 3# 30x 3# 30x 3# 30x 4# 30x 4#   Serratus Punches 3 way 15x5'' ea 3# 15x5'' 5# 15x5'' 5/10# 15x5'' 8/10# 15x5'' 8/10# 15x5'' 8/10# 15x5'' 10#   Shoulder ER w/ wand          Wall Slides          Mat Scap Walks   5 laps red 5 laps red 5 laps red 5 laps red 5 laps red   Mat Push up plus  10x 10x 10x 20x 20x 20x floor   Scap wall slides 15x red 15x red 15x red 15x red 20x red 20x red 20x red   UBE    3'/3'  3'/3' 3'/3' 3'/3'             Ther Activity          Squigz Seated at Mirror all         Bodyblade   10xeaflex, abd 10xeaflex, abd 10xeaflex, abd 10xeaflex, abd    Shoulder Press     2x10 8# 2x10 12# 2x10 12#

## 2024-01-04 ENCOUNTER — OFFICE VISIT (OUTPATIENT)
Dept: PHYSICAL THERAPY | Facility: CLINIC | Age: 29
End: 2024-01-04
Payer: COMMERCIAL

## 2024-01-04 DIAGNOSIS — M24.011 LOOSE BODY IN SHOULDER JOINT, RIGHT: ICD-10-CM

## 2024-01-04 DIAGNOSIS — S43.431D LABRAL TEAR OF SHOULDER, RIGHT, SUBSEQUENT ENCOUNTER: Primary | ICD-10-CM

## 2024-01-04 PROCEDURE — 97110 THERAPEUTIC EXERCISES: CPT | Performed by: PHYSICAL THERAPIST

## 2024-01-04 PROCEDURE — 97112 NEUROMUSCULAR REEDUCATION: CPT | Performed by: PHYSICAL THERAPIST

## 2024-01-04 NOTE — PROGRESS NOTES
Daily Note     Today's date: 2024  Patient name: Jackson Moreira  : 1995  MRN: [id number]  Referring provider: Michael Matos*  Dx:   Encounter Diagnosis     ICD-10-CM    1. Labral tear of shoulder, right, subsequent encounter  S43.431D       2. Loose body in shoulder joint, right  M24.011                      Subjective: Pt reports minimal soreness following last appt.      Objective: See treatment diary below      Assessment: Pt is tolerating exercises very well. Progressed to some throwing with trampoline and tolerated well. Will start to added closed chain with rotation next session.      Plan: Continue per plan of care.      Precautions: Rt posterior labrum repair 10/18/23; follow protocol      Manuals              Rt shoulder PROM FH  FH FH FH FH FH                                 Neuro Re-Ed          Scap retractions          Rows   2x15 black       Sh Ext   2x15 black       I,T,W,Y's prone 15xea 4#  15xea 2# 15xea 2# 15xea 3# 15xea 3# 15xea 4#   Row+ER+ Press 2x10 green   2x10 red 2x10 red 2x10 red 2x10 green   Plank Sh Taps 2x10     2x10 2x10   Bosu clocks 20xea      20x lat   Plank + Rotation          Ther Ex          Prayer stretch 3x15''  3x15'' 3x15'' 3x15'' 3x15'' 3x15''   Elbow ext/flex          Wrist flex/ext          Pulleys   20x ea flex, abd       Tband ER/IR          Shoulder Flexion w/ wand          Sidelying ER 30x4#  30x 3# 30x 3# 30x 3# 30x 4# 30x 4#   Sidelying ABD 30x4#  30x 3# 30x 3# 30x 3# 30x 4# 30x 4#   Serratus Punches 3 way 15x + twist 10#  15x5'' 5/10# 15x5'' 8/10# 15x5'' 8/10# 15x5'' 8/10# 15x5'' 10#   Mat Scap Walks 5 laps red  5 laps red 5 laps red 5 laps red 5 laps red 5 laps red   Mat Push up plus 20x floor  10x 10x 20x 20x 20x floor   Scap wall slides 20x red  15x red 15x red 20x red 20x red 20x red   UBE 3'/3'   3'/3' 3'/3' 3'/3' 3'/3'             Ther Activity          Squigz          Bodyblade   10xeaflex, abd 10xeaflex,  abd 10xeaflex, abd 10xeaflex, abd    Shoulder Press 2x10 15#    2x10 8# 2x10 12# 2x10 12#   Ball Toss Tramp 30x green

## 2024-01-09 ENCOUNTER — OFFICE VISIT (OUTPATIENT)
Dept: PHYSICAL THERAPY | Facility: CLINIC | Age: 29
End: 2024-01-09
Payer: COMMERCIAL

## 2024-01-09 DIAGNOSIS — S43.431D LABRAL TEAR OF SHOULDER, RIGHT, SUBSEQUENT ENCOUNTER: Primary | ICD-10-CM

## 2024-01-09 DIAGNOSIS — M24.011 LOOSE BODY IN SHOULDER JOINT, RIGHT: ICD-10-CM

## 2024-01-09 PROCEDURE — 97110 THERAPEUTIC EXERCISES: CPT | Performed by: PHYSICAL THERAPIST

## 2024-01-09 PROCEDURE — 97112 NEUROMUSCULAR REEDUCATION: CPT | Performed by: PHYSICAL THERAPIST

## 2024-01-09 NOTE — PROGRESS NOTES
Daily Note     Today's date: 2024  Patient name: Jackson Moreira  : 1995  MRN: [id number]  Referring provider: Michael Matos*  Dx:   Encounter Diagnosis     ICD-10-CM    1. Labral tear of shoulder, right, subsequent encounter  S43.431D       2. Loose body in shoulder joint, right  M24.011                      Subjective: Pt reports doing well.      Objective: See treatment diary below      Assessment: Pt tolerates strengthening and added exercises well. Fatigue noted with session. Will continue to progress as able.      Plan: Continue per plan of care.      Precautions: Rt posterior labrum repair 10/18/23; follow protocol      Manuals              Rt shoulder PROM FH    FH FH FH                                 Neuro Re-Ed          Scap retractions          Rows          Sh Ext          I,T,W,Y's prone 15xea 4# 15xea 4#   15xea 3# 15xea 3# 15xea 4#   Row+ER+ Press 2x10 green 2x10 green   2x10 red 2x10 red 2x10 green   Plank Sh Taps 2x10 2x10 over box    2x10 2x10   Bosu clocks 20xea 20xea     20x lat   Plank + Rotation  10x        Ther Ex          Prayer stretch 3x15''    3x15'' 3x15'' 3x15''   Sidelying ER 30x4# 90/90 20x 4#   30x 3# 30x 4# 30x 4#   Sidelying ABD 30x4# D/c   30x 3# 30x 4# 30x 4#   Serratus Punches 3 way 15x + twist 10# 15x + twist 10#   15x5'' 8/10# 15x5'' 8/10# 15x5'' 10#   Mat Scap Walks 5 laps red    5 laps red 5 laps red 5 laps red   Mat Push up plus 20x floor 20x floor   20x 20x 20x floor   Scap wall slides 20x red    20x red 20x red 20x red   UBE 3'/3' 3'/3'   3'/3' 3'/3' 3'/3'             Ther Activity          Squigz          Bodyblade     10xeaflex, abd 10xeaflex, abd    Shoulder Press 2x10 15# 2x10 15#   2x10 8# 2x10 12# 2x10 12#   Ball Toss Tramp 30x green                                                                  
yes

## 2024-01-11 ENCOUNTER — OFFICE VISIT (OUTPATIENT)
Dept: PHYSICAL THERAPY | Facility: CLINIC | Age: 29
End: 2024-01-11
Payer: COMMERCIAL

## 2024-01-11 DIAGNOSIS — S43.431D LABRAL TEAR OF SHOULDER, RIGHT, SUBSEQUENT ENCOUNTER: Primary | ICD-10-CM

## 2024-01-11 PROCEDURE — 97112 NEUROMUSCULAR REEDUCATION: CPT | Performed by: PHYSICAL THERAPIST

## 2024-01-11 PROCEDURE — 97110 THERAPEUTIC EXERCISES: CPT | Performed by: PHYSICAL THERAPIST

## 2024-01-11 NOTE — PROGRESS NOTES
"Daily Note     Today's date: 2024  Patient name: Jackson Moreira  : 1995  MRN: [id number]  Referring provider: Michael Matos*  Dx:   Encounter Diagnosis     ICD-10-CM    1. Labral tear of shoulder, right, subsequent encounter  S43.431D                      Subjective: Pt reports no new changes.       Objective: See treatment diary below      Assessment: Pt had good tolerance to progression of program. Reported fatigue with weight bearing activities.       Plan: Continue per plan of care.      Precautions: Rt posterior labrum repair 10/18/23; follow protocol      Manuals              Rt shoulder PROM FH  MD                                     Neuro Re-Ed          Scap retractions          Rows          Sh Ext          I,T,W,Y's prone 15xea 4# 15xea 4# 15x ea 4#    15xea 4#   Row+ER+ Press 2x10 green 2x10 green 2x10 green    2x10 green   UE walk overs 2x10 2x10 over box 8\" 2x10    2x10   Bosu wobbles 20xea 20xea 20x ea    20x lat   Plank + Rotation  10x 10x       Ther Ex          Prayer stretch 3x15''  3x15\"    3x15''   Sidelying ER 30x4# 90/90 20x 4# 90/90 20x 4#    30x 4#   Sidelying ABD 30x4# D/c     30x 4#   Serratus Punches 3 way 15x + twist 10# 15x + twist 10#     15x5'' 10#   Mat Scap Walks 5 laps red      5 laps red   Mat Push up plus 20x floor 20x floor 20 floor    20x floor   Scap wall slides 20x red      20x red   UBE 3'/3' 3'/3' 3'/3'    3'/3'             Ther Activity          Squigz          Bodyblade   PNF patterns x30\" ea       Shoulder Press 2x10 15# 2x10 15# 2x10 15#    2x10 12#   Ball Toss Tramp 30x green         UE cone tap with slider   x5                                                        "

## 2024-01-16 ENCOUNTER — OFFICE VISIT (OUTPATIENT)
Dept: PHYSICAL THERAPY | Facility: CLINIC | Age: 29
End: 2024-01-16
Payer: COMMERCIAL

## 2024-01-16 DIAGNOSIS — S43.431D LABRAL TEAR OF SHOULDER, RIGHT, SUBSEQUENT ENCOUNTER: Primary | ICD-10-CM

## 2024-01-16 DIAGNOSIS — M24.011 LOOSE BODY IN SHOULDER JOINT, RIGHT: ICD-10-CM

## 2024-01-16 PROCEDURE — 97112 NEUROMUSCULAR REEDUCATION: CPT | Performed by: PHYSICAL THERAPIST

## 2024-01-16 PROCEDURE — 97110 THERAPEUTIC EXERCISES: CPT | Performed by: PHYSICAL THERAPIST

## 2024-01-16 NOTE — PROGRESS NOTES
"Daily Note     Today's date: 2024  Patient name: Jackson Moreira  : 1995  MRN: [id number]  Referring provider: Michael Matos*  Dx:   Encounter Diagnosis     ICD-10-CM    1. Labral tear of shoulder, right, subsequent encounter  S43.431D       2. Loose body in shoulder joint, right  M24.011                      Subjective: Pt reports shoulder is doing well. Still some clicking and soreness from the weather.      Objective: See treatment diary below      Assessment: Pt is progressing very well with strengthening. Pt notes fatigue at the end of session. Pt is progressing back into gym routines and discuss positions with the barbell. Continue to progress as able.      Plan: Continue per plan of care.      Precautions: Rt posterior labrum repair 10/18/23; follow protocol      Manuals              Rt shoulder PROM   MD [facility] [place]                                 Neuro Re-Ed          Scap retractions          Rows          Sh Ext          I,T,W,Y's prone 15xea 4# 15xea 4# 15x ea 4# 15xea 4#   15xea 4#   Row+ER+ Press 2x10 green 2x10 green 2x10 green 2x10 blue   2x10 green   UE walk overs 2x10 2x10 over box 8\" 2x10 10xea 8'' + pushup   2x10   Bosu wobbles 20xea 20xea 20x ea 30xea   20x lat   Plank + Rotation  10x 10x 10x      Ther Ex          Prayer stretch 3x15''  3x15\" 1x20''   3x15''   Sidelying ER 30x4# 90/90 20x 4# 90/90 20x 4# 90/90 20x 4#   30x 4#   Sidelying ABD 30x4# D/c     30x 4#   Serratus Punches 3 way 15x + twist 10# 15x + twist 10#     15x5'' 10#   Mat Scap Walks 5 laps red      5 laps red   Mat Push up plus 20x floor 20x floor 20 floor 20 floor   20x floor   Scap wall slides 20x red      20x red   UBE 3'/3' 3'/3' 3'/3' 3'/3'   3'/3'             Ther Activity          Squigz          Bodyblade   PNF patterns x30\" ea PNF patterns x30\" ea      Shoulder Press 2x10 15# 2x10 15# 2x10 15# 2x10 15#   2x10 12#   Brent Toss Tramp 30x green         UE cone tap with slider   x5 " 5x

## 2024-01-18 ENCOUNTER — OFFICE VISIT (OUTPATIENT)
Dept: PHYSICAL THERAPY | Facility: CLINIC | Age: 29
End: 2024-01-18
Payer: COMMERCIAL

## 2024-01-18 DIAGNOSIS — M24.011 LOOSE BODY IN SHOULDER JOINT, RIGHT: ICD-10-CM

## 2024-01-18 DIAGNOSIS — S43.431D LABRAL TEAR OF SHOULDER, RIGHT, SUBSEQUENT ENCOUNTER: Primary | ICD-10-CM

## 2024-01-18 PROCEDURE — 97112 NEUROMUSCULAR REEDUCATION: CPT | Performed by: PHYSICAL THERAPIST

## 2024-01-18 PROCEDURE — 97110 THERAPEUTIC EXERCISES: CPT | Performed by: PHYSICAL THERAPIST

## 2024-01-18 NOTE — PROGRESS NOTES
"Daily Note     Today's date: 2024  Patient name: Jackson Moreira  : 1995  MRN: [id number]  Referring provider: Michael Matos*  Dx:   Encounter Diagnosis     ICD-10-CM    1. Labral tear of shoulder, right, subsequent encounter  S43.431D       2. Loose body in shoulder joint, right  M24.011                      Subjective: Pt reports he feels his shoulder is a little looser.      Objective: See treatment diary below      Assessment: Pt tolerating manual very well, some stm to subscap/lats for tightness overhead improved following. Pt tolerating strengthening closed chain and open chain. Continue to progress as able.      Plan: Continue per plan of care.      Precautions: Rt posterior labrum repair 10/18/23; follow protocol      Manuals              Rt shoulder PROM   MD [facility]                                 Neuro Re-Ed          Scap retractions          Rows          Sh Ext          I,T,W,Y's prone 15xea 4# 15xea 4# 15x ea 4# 15xea 4# 15xea 4#  15xea 4#   Row+ER+ Press 2x10 green 2x10 green 2x10 green 2x10 blue 2x10 blue  2x10 green   UE walk overs 2x10 2x10 over box 8\" 2x10 10xea 8'' + pushup 10xea 8''+pushup  2x10   Bosu wobbles 20xea 20xea 20x ea 30xea 30xea  20x lat   Plank + Rotation  10x 10x 10x 10x     Ther Ex          Prayer stretch 3x15''  3x15\" 1x20''   3x15''   Sidelying ER 30x4# 90/90 20x 4# 90/90 20x 4# 90/90 20x 4# 90/90 20x 5#  30x 4#   Sidelying ABD 30x4# D/c     30x 4#   Serratus Punches 3 way 15x + twist 10# 15x + twist 10#     15x5'' 10#   Mat Scap Walks 5 laps red      5 laps red   Mat Push up plus 20x floor 20x floor 20 floor 20 floor 20x floor  20x floor   Scap wall slides 20x red      20x red   UBE 3'/3' 3'/3' 3'/3' 3'/3' 3'/3'  3'/3'             Ther Activity          Squigz          Bodyblade   PNF patterns x30\" ea PNF patterns x30\" ea PNF patterns x30\" ea     Shoulder Press 2x10 15# 2x10 15# 2x10 15# 2x10 15# 3x10 15#  2x10 12#   Ball " [place] 30x green         UE cone tap with slider   x5 5x 5x

## 2024-01-23 ENCOUNTER — OFFICE VISIT (OUTPATIENT)
Dept: PHYSICAL THERAPY | Facility: CLINIC | Age: 29
End: 2024-01-23
Payer: COMMERCIAL

## 2024-01-23 DIAGNOSIS — M24.011 LOOSE BODY IN SHOULDER JOINT, RIGHT: ICD-10-CM

## 2024-01-23 DIAGNOSIS — S43.431D LABRAL TEAR OF SHOULDER, RIGHT, SUBSEQUENT ENCOUNTER: Primary | ICD-10-CM

## 2024-01-23 PROCEDURE — 97110 THERAPEUTIC EXERCISES: CPT | Performed by: PHYSICAL THERAPIST

## 2024-01-23 PROCEDURE — 97112 NEUROMUSCULAR REEDUCATION: CPT | Performed by: PHYSICAL THERAPIST

## 2024-01-23 NOTE — PROGRESS NOTES
"Daily Note     Today's date: 2024  Patient name: Jackson Moreira  : 1995  MRN: [id number]  Referring provider: Michael Matos*  Dx:   Encounter Diagnosis     ICD-10-CM    1. Labral tear of shoulder, right, subsequent encounter  S43.431D       2. Loose body in shoulder joint, right  M24.011                      Subjective: Pt reports shoulder is doing well some random sharp pains on the top of the shoulder at times.      Objective: See treatment diary below      Assessment: Tenderness along the top of the shoulder with palpation along the long head of the biceps, added eccentrics curls for loading the tendon. Pt fatigues with current POC but otherwise doing very well.      Plan: Continue per plan of care.      Precautions: Rt posterior labrum repair 10/18/23; follow protocol      Manuals               Rt shoulder PROM FH  MD [facility]                                  Neuro Re-Ed          Scap retractions          Rows          Sh Ext          I,T,W,Y's prone 15xea 4# 15xea 4# 15x ea 4# 15xea 4# 15xea 4# 15xea 4# + prone ER    Row+ER+ Press 2x10 green 2x10 green 2x10 green 2x10 blue 2x10 blue 2x10 blue    UE walk overs 2x10 2x10 over box 8\" 2x10 10xea 8'' + pushup 10xea 8''+pushup 10xea 8''+pushup on bosu    Bosu wobbles 20xea 20xea 20x ea 30xea 30xea 30xea    Plank + Rotation  10x 10x 10x 10x 10x    Ther Ex          Prayer stretch 3x15''  3x15\" 1x20''      Sidelying ER 30x4# 90/90 20x 4# 90/90 20x 4# 90/90 20x 4# 90/90 20x 5# 90/90 20x 5#    Sidelying ABD 30x4# D/c        Serratus Punches 3 way 15x + twist 10# 15x + twist 10#        Mat Scap Walks 5 laps red         Mat Push up plus 20x floor 20x floor 20 floor 20 floor 20x floor 20x floor    Scap wall slides 20x red         UBE 3'/3' 3'/3' 3'/3' 3'/3' 3'/3' 3'/3'    Eccentrics bicep curls      30x 10#    Ther Activity          Squigz          Bodyblade   PNF patterns x30\" ea PNF patterns x30\" ea PNF patterns x30\" ea PNF " "patterns x30\" ea    Shoulder Press 2x10 15# 2x10 15# 2x10 15# 2x10 15# 3x10 15# 3x10 15#    Ball Toss Tramp 30x green         UE cone tap with slider   x5 5x 5x                                                            "

## 2024-01-25 ENCOUNTER — OFFICE VISIT (OUTPATIENT)
Dept: PHYSICAL THERAPY | Facility: CLINIC | Age: 29
End: 2024-01-25
Payer: COMMERCIAL

## 2024-01-25 DIAGNOSIS — S43.431D LABRAL TEAR OF SHOULDER, RIGHT, SUBSEQUENT ENCOUNTER: Primary | ICD-10-CM

## 2024-01-25 DIAGNOSIS — M24.011 LOOSE BODY IN SHOULDER JOINT, RIGHT: ICD-10-CM

## 2024-01-25 PROCEDURE — 97140 MANUAL THERAPY 1/> REGIONS: CPT | Performed by: PHYSICAL THERAPIST

## 2024-01-25 PROCEDURE — 97110 THERAPEUTIC EXERCISES: CPT | Performed by: PHYSICAL THERAPIST

## 2024-01-25 NOTE — PROGRESS NOTES
"Daily Note     Today's date: 2024  Patient name: Jackson Moreira  : 1995  MRN: [id number]  Referring provider: Michael Matos*  Dx:   Encounter Diagnosis     ICD-10-CM    1. Labral tear of shoulder, right, subsequent encounter  S43.431D       2. Loose body in shoulder joint, right  M24.011                      Subjective: patient offers no new complaints      Objective: See treatment diary below      Assessment: Tolerated treatment well. Continued with program as noted below. Continues to have tightness in right lat and subscap. Patient demonstrated fatigue post treatment, exhibited good technique with therapeutic exercises, and would benefit from continued PT      Plan: Continue per plan of care.      Precautions: Rt posterior labrum repair 10/18/23; follow protocol      Manuals              Rt shoulder PROM FH  MD [facility]                                 Neuro Re-Ed          Scap retractions          Rows          Sh Ext          I,T,W,Y's prone 15xea 4# 15xea 4# 15x ea 4# 15xea 4# 15xea 4# 15xea 4# + prone ER 15xea 4# + prone ER   Row+ER+ Press 2x10 green 2x10 green 2x10 green 2x10 blue 2x10 blue 2x10 blue 2x10 blue   UE walk overs 2x10 2x10 over box 8\" 2x10 10xea 8'' + pushup 10xea 8''+pushup 10xea 8''+pushup on bosu 10xea 8''+pushup on bosu    Bosu wobbles 20xea 20xea 20x ea 30xea 30xea 30xea 30xea    Plank + Rotation  10x 10x 10x 10x 10x 10x   Ther Ex          Prayer stretch 3x15''  3x15\" 1x20''      Sidelying ER 30x4# 90/90 20x 4# 90/90 20x 4# 90/90 20x 4# 90/90 20x 5# 90/90 20x 5# 90/90 20x 5#   Sidelying ABD 30x4# D/c        Serratus Punches 3 way 15x + twist 10# 15x + twist 10#        Mat Scap Walks 5 laps red         Mat Push up plus 20x floor 20x floor 20 floor 20 floor 20x floor 20x floor 20x floor   Scap wall slides 20x red         UBE 3'/3' 3'/3' 3'/3' 3'/3' 3'/3' 3'/3' 3'/3'   Eccentrics bicangella curls      30x 10# 30x 10#   Ther Activity        " "  Squigz          Bodyblade   PNF patterns x30\" ea PNF patterns x30\" ea PNF patterns x30\" ea PNF patterns x30\" ea PNF patterns x30\" ea   Shoulder Press 2x10 15# 2x10 15# 2x10 15# 2x10 15# 3x10 15# 3x10 15# 3x10 15#   Ball Toss Tramp 30x green         UE cone tap with slider   x5 5x 5x                                                              "

## 2024-01-30 ENCOUNTER — OFFICE VISIT (OUTPATIENT)
Dept: PHYSICAL THERAPY | Facility: CLINIC | Age: 29
End: 2024-01-30
Payer: COMMERCIAL

## 2024-01-30 DIAGNOSIS — M24.011 LOOSE BODY IN SHOULDER JOINT, RIGHT: ICD-10-CM

## 2024-01-30 DIAGNOSIS — S43.431D LABRAL TEAR OF SHOULDER, RIGHT, SUBSEQUENT ENCOUNTER: Primary | ICD-10-CM

## 2024-01-30 PROCEDURE — 97110 THERAPEUTIC EXERCISES: CPT

## 2024-01-30 PROCEDURE — 97140 MANUAL THERAPY 1/> REGIONS: CPT

## 2024-01-30 PROCEDURE — 97112 NEUROMUSCULAR REEDUCATION: CPT

## 2024-01-30 NOTE — PROGRESS NOTES
"Daily Note     Today's date: 2024  Patient name: Jackson Moreira  : 1995  MRN: [id number]  Referring provider: Michael Matos*  Dx:   Encounter Diagnosis     ICD-10-CM    1. Labral tear of shoulder, right, subsequent encounter  S43.431D       2. Loose body in shoulder joint, right  M24.011                      Subjective: Patient reports his shoulder feels stiff today.        Objective: See treatment diary below      Assessment: Tolerated treatment well. Patient demonstrated fatigue post treatment, exhibited good technique with therapeutic exercises, and would benefit from continued PT.  Initiated kneeling lat stretch with good tolerance.  Progress with ROM and strengthening as able.        Plan: Continue per plan of care.      Precautions: Rt posterior labrum repair 10/18/23; follow protocol      Manuals               Rt shoulder PROM FH  MD [facility]                                    Neuro Re-Ed           Scap retractions           Rows           Sh Ext           I,T,W,Y's prone 15xea 4# 15xea 4# 15x ea 4# 15xea 4# 15xea 4# 15xea 4# + prone ER 15xea 4# + prone ER 15xea 4# + prone ER   Row+ER+ Press 2x10 green 2x10 green 2x10 green 2x10 blue 2x10 blue 2x10 blue 2x10 blue 2x10 blue   UE walk overs 2x10 2x10 over box 8\" 2x10 10xea 8'' + pushup 10xea 8''+pushup 10xea 8''+pushup on bosu 10xea 8''+pushup on bosu  10xea 8''+pushup on bosu    Bosu wobbles 20xea 20xea 20x ea 30xea 30xea 30xea 30xea  30x ea   Plank + Rotation  10x 10x 10x 10x 10x 10x 10x   Ther Ex           Prayer stretch 3x15''  3x15\" 1x20''       Sidelying ER 30x4# 90/90 20x 4# 90/90 20x 4# 90/90 20x 4# 90/90 20x 5# 90/90 20x 5# 90/90 20x 5# 90/90  20x 5#   Sidelying ABD 30x4# D/c         Serratus Punches 3 way 15x + twist 10# 15x + twist 10#         Mat Scap Walks 5 laps red          Mat Push up plus 20x floor 20x floor 20 floor 20 floor 20x floor 20x floor 20x floor 20x floor   Kneeling " "prayer stretch/ lat stretch        20\" x3              Scap wall slides 20x red          UBE 3'/3' 3'/3' 3'/3' 3'/3' 3'/3' 3'/3' 3'/3' 3'/3'   Eccentrics bicep curls      30x 10# 30x 10# 30x 10#   Ther Activity           Squigz           Bodyblade   PNF patterns x30\" ea PNF patterns x30\" ea PNF patterns x30\" ea PNF patterns x30\" ea PNF patterns x30\" ea PNF patterns x30\" ea   Shoulder Press 2x10 15# 2x10 15# 2x10 15# 2x10 15# 3x10 15# 3x10 15# 3x10 15# 3x10 15#   Ball Toss Tramp 30x green          UE cone tap with slider   x5 5x 5x                                                                  "

## 2024-02-01 ENCOUNTER — OFFICE VISIT (OUTPATIENT)
Dept: PHYSICAL THERAPY | Facility: CLINIC | Age: 29
End: 2024-02-01
Payer: COMMERCIAL

## 2024-02-01 DIAGNOSIS — S43.431D LABRAL TEAR OF SHOULDER, RIGHT, SUBSEQUENT ENCOUNTER: Primary | ICD-10-CM

## 2024-02-01 DIAGNOSIS — M24.011 LOOSE BODY IN SHOULDER JOINT, RIGHT: ICD-10-CM

## 2024-02-01 PROCEDURE — 97110 THERAPEUTIC EXERCISES: CPT | Performed by: PHYSICAL THERAPIST

## 2024-02-01 PROCEDURE — 97112 NEUROMUSCULAR REEDUCATION: CPT | Performed by: PHYSICAL THERAPIST

## 2024-02-01 NOTE — PROGRESS NOTES
"Daily Note     Today's date: 2024  Patient name: Jackson Moreira  : 1995  MRN: [id number]  Referring provider: Michael Matos*  Dx:   Encounter Diagnosis     ICD-10-CM    1. Labral tear of shoulder, right, subsequent encounter  S43.431D       2. Loose body in shoulder joint, right  M24.011                      Subjective: Pt reports that he continues with sharp pain with shoulder raising at end range.       Objective: See treatment diary below      Assessment: Pt tolerates treatment with significant fatigue. Pt with some proximal biceps irritation therefore trialed shoulder flex with arm supinated and transverse fx to proximal bicep tendon. Pt will benefit from continued skilled PT.       Plan: Continue per plan of care.      Precautions: Rt posterior labrum repair 10/18/23; follow protocol      Manuals               Rt shoulder PROM DL  MD [facility]                                    Neuro Re-Ed           Scap retractions           Rows           Sh Ext           I,T,W,Y's prone 15xea 4# + prone ER 15xea 4# 15x ea 4# 15xea 4# 15xea 4# 15xea 4# + prone ER 15xea 4# + prone ER 15xea 4# + prone ER   Row+ER+ Press 2x10 blue 2x10 green 2x10 green 2x10 blue 2x10 blue 2x10 blue 2x10 blue 2x10 blue   UE walk overs 10xea 8''+pushup on bosu  2x10 over box 8\" 2x10 10xea 8'' + pushup 10xea 8''+pushup 10xea 8''+pushup on bosu 10xea 8''+pushup on bosu  10xea 8''+pushup on bosu    Bosu wobbles 30x ea 20xea 20x ea 30xea 30xea 30xea 30xea  30x ea   Plank + Rotation 10x  10x 10x 10x 10x 10x 10x 10x   Ther Ex           Prayer stretch   3x15\" 1x20''       Sidelying ER 90/90  20x 5# 90/90 20x 4# 90/90 20x 4# 90/90 20x 4# 90/90 20x 5# 90/90 20x 5# 90/90 20x 5# 90/90  20x 5#   Sidelying ABD  D/c         Serratus Punches 3 way  15x + twist 10#         Mat Scap Walks           Mat Push up plus 20x floor 20x floor 20 floor 20 floor 20x floor 20x floor 20x floor 20x floor " "  Kneeling prayer stretch/ lat stretch 20\" x3       20\" x3              Scap wall slides           UBE 3'/3' 3'/3' 3'/3' 3'/3' 3'/3' 3'/3' 3'/3' 3'/3'   Palm up shoulder flex 5# 2x10          Eccentrics bicep curls 30x 10#     30x 10# 30x 10# 30x 10#   Ther Activity           Squigz           Bodyblade PNF patterns x30\" ea  PNF patterns x30\" ea PNF patterns x30\" ea PNF patterns x30\" ea PNF patterns x30\" ea PNF patterns x30\" ea PNF patterns x30\" ea   Shoulder Press 2x10 15# 2x10 15# 2x10 15# 2x10 15# 3x10 15# 3x10 15# 3x10 15# 3x10 15#   Ball Toss Tramp 30x green          UE cone tap with slider   x5 5x 5x                                                                    "

## 2024-02-05 ENCOUNTER — OFFICE VISIT (OUTPATIENT)
Dept: OBGYN CLINIC | Facility: OTHER | Age: 29
End: 2024-02-05
Payer: COMMERCIAL

## 2024-02-05 VITALS
WEIGHT: 250 LBS | DIASTOLIC BLOOD PRESSURE: 74 MMHG | HEART RATE: 52 BPM | HEIGHT: 72 IN | SYSTOLIC BLOOD PRESSURE: 119 MMHG | BODY MASS INDEX: 33.86 KG/M2

## 2024-02-05 DIAGNOSIS — S43.431D LABRAL TEAR OF SHOULDER, RIGHT, SUBSEQUENT ENCOUNTER: Primary | ICD-10-CM

## 2024-02-05 PROCEDURE — 99213 OFFICE O/P EST LOW 20 MIN: CPT | Performed by: PHYSICIAN ASSISTANT

## 2024-02-05 NOTE — PROGRESS NOTES
Assessment  Diagnoses and all orders for this visit:    Labral tear of shoulder, right, subsequent encounter      Discussion and Plan:    Right shoulder is healthy on exam.  He lacks a few degrees of ER but this will come with time.  He can slowly return to activities to tolerance.  He wants to get back to shooting and this is something he can start doing now.  I would recommend shooting only a few rounds to see how he feels before shooting a prolonged period of time.  This is the same for working out.  I recommend using his therapist as his guide for slowly transitioning back to the gym.  Light weights and high reps are recommended and then slowly increasing the weight while decreasing reps.      Follow up: PRN    Subjective:   Patient ID: Jackson Moreira is a 28 y.o. male    Jackson returns to the office in follow up of the right shoulder.  He is 3.5 months s/p posterior labral repair.  He has been compliant with formal PT and his HEP.  He is eager to return to working out.  Denies any signification pain in the right shoulder.  Admits to occasional sharp with extremes of FF.  Denies new injury or trauma.      The following portions of the patient's history were reviewed and updated as appropriate: allergies, current medications, past family history, past medical history, past social history, past surgical history and problem list.      Objective:  /74 (BP Location: Left arm, Patient Position: Sitting, Cuff Size: Large)   Pulse (!) 52   Ht 6' (1.829 m)   Wt 113 kg (250 lb)   BMI 33.91 kg/m²       Right Shoulder Exam     Tenderness   The patient is experiencing no tenderness.    Range of Motion   External rotation:  70   Forward flexion:  normal     Muscle Strength   External rotation: 5/5   Supraspinatus: 5/5     Tests   Phelan test: negative  Impingement: negative  Drop arm: negative    Other   Erythema: absent  Sensation: normal  Pulse: present      Left Shoulder Exam     Range of Motion   External  rotation:  90   Forward flexion:  normal              Physical Exam  Constitutional:       Appearance: He is well-developed.   HENT:      Head: Normocephalic.   Pulmonary:      Effort: No respiratory distress.      Breath sounds: No wheezing.   Musculoskeletal:      Cervical back: Normal range of motion.   Skin:     General: Skin is warm and dry.   Neurological:      Mental Status: He is alert and oriented to person, place, and time.   Psychiatric:         Behavior: Behavior normal.         Thought Content: Thought content normal.         Judgment: Judgment normal.

## 2024-02-06 ENCOUNTER — OFFICE VISIT (OUTPATIENT)
Dept: PHYSICAL THERAPY | Facility: CLINIC | Age: 29
End: 2024-02-06
Payer: COMMERCIAL

## 2024-02-06 DIAGNOSIS — S43.431D LABRAL TEAR OF SHOULDER, RIGHT, SUBSEQUENT ENCOUNTER: Primary | ICD-10-CM

## 2024-02-06 DIAGNOSIS — M24.011 LOOSE BODY IN SHOULDER JOINT, RIGHT: ICD-10-CM

## 2024-02-06 PROCEDURE — 97112 NEUROMUSCULAR REEDUCATION: CPT | Performed by: PHYSICAL THERAPIST

## 2024-02-06 PROCEDURE — 97110 THERAPEUTIC EXERCISES: CPT | Performed by: PHYSICAL THERAPIST

## 2024-02-06 NOTE — PROGRESS NOTES
Daily Note     Today's date: 2024  Patient name: Jackson Moreira  : 1995  MRN: [id number]  Referring provider: Michael Matos*  Dx:   Encounter Diagnosis     ICD-10-CM    1. Labral tear of shoulder, right, subsequent encounter  S43.431D       2. Loose body in shoulder joint, right  M24.011                      Subjective: Pt reports doctor is pleased with progress, Cleared from them. Pt wants to start returning to the gym for lifting UE.      Objective: See treatment diary below      Assessment: Educated and discussed gym routine and programs with modified exercises as well as dumbbell resistance for stability component. Also discussed GH mechanics with anterior tilt with GH with gym exercises to be aware. Pt is tolerating body weight resisted exercises well.       Plan: Continue per plan of care.      Precautions: Rt posterior labrum repair 10/18/23; follow protocol      Manuals               Rt shoulder PROM DL FH  FH FH FH SK ksg                                    Neuro Re-Ed           Scap retractions           Rows           Sh Ext           I,T,W,Y's prone 15xea 4# + prone ER Prone ER 15xea 5#  15xea 4# 15xea 4# 15xea 4# + prone ER 15xea 4# + prone ER 15xea 4# + prone ER   Row+ER+ Press 2x10 blue 2x10 blue  2x10 blue 2x10 blue 2x10 blue 2x10 blue 2x10 blue   UE walk overs 10xea 8''+pushup on bosu  10xea 8''+pushup on bosu   10xea 8'' + pushup 10xea 8''+pushup 10xea 8''+pushup on bosu 10xea 8''+pushup on bosu  10xea 8''+pushup on bosu    Bosu wobbles 30x ea 30xea  30xea 30xea 30xea 30xea  30x ea   Plank + Rotation 10x  10x  10x 10x 10x 10x 10x   Ther Ex           Prayer stretch    1x20''       Sidelying ER 90/90  20x 5# 90/90 20x 5#  90/90 20x 4# 90/90 20x 5# 90/90 20x 5# 90/90 20x 5# 90/90  20x 5#   Sidelying ABD           Serratus Punches 3 way           Mat Scap Walks           Mat Push up plus 20x floor 20x floor  20 floor 20x floor 20x floor 20x floor  "20x floor   Kneeling prayer stretch/ lat stretch 20\" x3 3x20''      20\" x3              Scap wall slides           UBE 3'/3' 3'/3'  3'/3' 3'/3' 3'/3' 3'/3' 3'/3'   Palm up shoulder flex 5# 2x10          Eccentrics bicep curls 30x 10# 30x 15#    30x 10# 30x 10# 30x 10#   Ther Activity           Squigz           Bodyblade PNF patterns x30\" ea PNF patterns x30\" ea  PNF patterns x30\" ea PNF patterns x30\" ea PNF patterns x30\" ea PNF patterns x30\" ea PNF patterns x30\" ea   Shoulder Press 2x10 15# 3x10 20#  2x10 15# 3x10 15# 3x10 15# 3x10 15# 3x10 15#   Ball Toss Tramp 30x green          UE cone tap with slider    5x 5x                                                                      "

## 2024-02-08 ENCOUNTER — OFFICE VISIT (OUTPATIENT)
Dept: PHYSICAL THERAPY | Facility: CLINIC | Age: 29
End: 2024-02-08
Payer: COMMERCIAL

## 2024-02-08 DIAGNOSIS — S43.431D LABRAL TEAR OF SHOULDER, RIGHT, SUBSEQUENT ENCOUNTER: Primary | ICD-10-CM

## 2024-02-08 DIAGNOSIS — M24.011 LOOSE BODY IN SHOULDER JOINT, RIGHT: ICD-10-CM

## 2024-02-08 PROCEDURE — 97112 NEUROMUSCULAR REEDUCATION: CPT | Performed by: PHYSICAL THERAPIST

## 2024-02-08 PROCEDURE — 97110 THERAPEUTIC EXERCISES: CPT | Performed by: PHYSICAL THERAPIST

## 2024-02-08 NOTE — PROGRESS NOTES
"Daily Note     Today's date: 2024  Patient name: Jackson Moreira  : 1995  MRN: [id number]  Referring provider: Michael Matos*  Dx:   Encounter Diagnosis     ICD-10-CM    1. Labral tear of shoulder, right, subsequent encounter  S43.431D       2. Loose body in shoulder joint, right  M24.011                      Subjective: Pt reports improvements with continued discomfort at end range flexion.       Objective: See treatment diary below      Assessment: Pt had good tolerance to all activities. Pt reports that he has returned to the gym. Slight discomfort with end range PROM into flex/ab.       Plan: Pt to f/u prn.      Precautions: Rt posterior labrum repair 10/18/23; follow protocol      Manuals               Rt shoulder PROM DL FH MD   FH SK ksg                                    Neuro Re-Ed           Scap retractions           Rows           Sh Ext           I,T,W,Y's prone 15xea 4# + prone ER Prone ER 15xea 5# Prone ER 15xea 5#   15xea 4# + prone ER 15xea 4# + prone ER 15xea 4# + prone ER   Row+ER+ Press 2x10 blue 2x10 blue 2x10 blue    2x10 blue 2x10 blue 2x10 blue   UE walk overs 10xea 8''+pushup on bosu  10xea 8''+pushup on bosu  10x ea 8\" push up   10xea 8''+pushup on bosu 10xea 8''+pushup on bosu  10xea 8''+pushup on bosu    Bosu wobbles 30x ea 30xea 30x ea   30xea 30xea  30x ea   Plank + Rotation 10x  10x 10x   10x 10x 10x   Ther Ex           Prayer stretch           Sidelying ER 90/90  20x 5# 90/90 20x 5# 90/90 20x 5#   90/90 20x 5# 90/90 20x 5# 90/90  20x 5#   Sidelying ABD           Serratus Punches 3 way           Mat Scap Walks           Mat Push up plus 20x floor 20x floor 20x floor   20x floor 20x floor 20x floor   Kneeling prayer stretch/ lat stretch 20\" x3 3x20''      20\" x3              Scap wall slides           UBE 3'/3' 3'/3' 3'/3'   3'/3' 3'/3' 3'/3'   Palm up shoulder flex 5# 2x10          Eccentrics bicep curls 30x 10# 30x 15# 30x 20#   30x 10# " "30x 10# 30x 10#   Ther Activity           Squigz           Bodyblade PNF patterns x30\" ea PNF patterns x30\" ea PNF patterns x30\" ea   PNF patterns x30\" ea PNF patterns x30\" ea PNF patterns x30\" ea   Shoulder Press 2x10 15# 3x10 20# 3x10 20#   3x10 15# 3x10 15# 3x10 15#   Ball Toss Tramp 30x green          UE cone tap with slider                                                                             "

## 2024-02-13 ENCOUNTER — APPOINTMENT (OUTPATIENT)
Dept: PHYSICAL THERAPY | Facility: CLINIC | Age: 29
End: 2024-02-13
Payer: COMMERCIAL

## 2024-02-15 ENCOUNTER — APPOINTMENT (OUTPATIENT)
Dept: PHYSICAL THERAPY | Facility: CLINIC | Age: 29
End: 2024-02-15
Payer: COMMERCIAL

## 2024-02-21 PROBLEM — J01.00 ACUTE NON-RECURRENT MAXILLARY SINUSITIS: Status: RESOLVED | Noted: 2019-03-29 | Resolved: 2024-02-21

## 2024-02-21 PROBLEM — Z00.00 HEALTHCARE MAINTENANCE: Status: RESOLVED | Noted: 2018-08-14 | Resolved: 2024-02-21

## 2024-02-21 PROBLEM — Z13.220 SCREENING FOR HYPERLIPIDEMIA: Status: RESOLVED | Noted: 2018-08-14 | Resolved: 2024-02-21

## 2024-10-18 ENCOUNTER — OFFICE VISIT (OUTPATIENT)
Age: 29
End: 2024-10-18
Payer: COMMERCIAL

## 2024-10-18 VITALS
WEIGHT: 230.4 LBS | OXYGEN SATURATION: 97 % | HEIGHT: 72 IN | DIASTOLIC BLOOD PRESSURE: 70 MMHG | SYSTOLIC BLOOD PRESSURE: 120 MMHG | TEMPERATURE: 97.7 F | HEART RATE: 62 BPM | BODY MASS INDEX: 31.21 KG/M2

## 2024-10-18 DIAGNOSIS — E66.09 CLASS 1 OBESITY DUE TO EXCESS CALORIES WITHOUT SERIOUS COMORBIDITY WITH BODY MASS INDEX (BMI) OF 31.0 TO 31.9 IN ADULT: ICD-10-CM

## 2024-10-18 DIAGNOSIS — F41.9 ANXIETY: ICD-10-CM

## 2024-10-18 DIAGNOSIS — Z02.1 PRE-EMPLOYMENT EXAMINATION: Primary | ICD-10-CM

## 2024-10-18 DIAGNOSIS — S06.9XAS TRAUMATIC BRAIN INJURY, WITH UNKNOWN LOSS OF CONSCIOUSNESS STATUS, SEQUELA (HCC): ICD-10-CM

## 2024-10-18 DIAGNOSIS — E66.811 CLASS 1 OBESITY DUE TO EXCESS CALORIES WITHOUT SERIOUS COMORBIDITY WITH BODY MASS INDEX (BMI) OF 31.0 TO 31.9 IN ADULT: ICD-10-CM

## 2024-10-18 DIAGNOSIS — Z80.8 FAMILY HISTORY OF THYROID CANCER: ICD-10-CM

## 2024-10-18 DIAGNOSIS — J30.2 SEASONAL ALLERGIC RHINITIS, UNSPECIFIED TRIGGER: ICD-10-CM

## 2024-10-18 DIAGNOSIS — Z57.5 OCCUPATIONAL EXPOSURE TO TOXIC AGENTS IN OTHER INDUSTRIES: ICD-10-CM

## 2024-10-18 PROCEDURE — 99905: CPT

## 2024-10-18 PROCEDURE — 99213 OFFICE O/P EST LOW 20 MIN: CPT

## 2024-10-18 RX ORDER — SUMATRIPTAN 50 MG/1
50 TABLET, FILM COATED ORAL ONCE AS NEEDED
COMMUNITY

## 2024-10-18 SDOH — HEALTH STABILITY - PHYSICAL HEALTH: OCCUPATIONAL EXPOSURE TO TOXIC AGENTS IN OTHER INDUSTRIES: Z57.5

## 2024-10-18 NOTE — ASSESSMENT & PLAN NOTE
Patient notes he has been making healthy changes to diet and exercise habits, especially following shoulder surgery

## 2024-10-18 NOTE — ASSESSMENT & PLAN NOTE
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  Has Imitrex, but states that it does not help  He also notes he recently had an MRI completed, results not available.  Advised [facility] to call [facility] and asked them to [facility] results to our office  [facility] for now, however offered patient neurology referral to which she will hold off on at this time

## 2024-10-18 NOTE — PROGRESS NOTES
Ambulatory Visit  Name: Jackson Moreira      : 1995      MRN: [id number]  Encounter Provider: Monica Massey PA-C  Encounter Date: 10/18/2024   Encounter department: [facility] [place]    Assessment & Plan  Pre-employment examination  No limitations to physical activity at this time.  Patient does go to the gym frequently.  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  Has Imitrex, but states that it does not help  He also notes he recently had an MRI completed, results not available.  Advised [facility] to call [facility] and asked them to [facility] results to our office  [facility] for now, however offered patient neurology referral to which she will hold off on at this time       Anxiety  Managed without medications.  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  Tongue cancer in grandfather, diagnosed in 70s (non smoker).  Paternal aunt diagnosed with colon  cancer  Patient requesting referral to genetic counselor to discuss if any testing can be completed.  Referral placed today  Also discussed DNA answers program with patient  Orders:    Ambulatory Referral to Oncology Genetics; Future      Depression Screening and Follow-up Plan: Patient was screened for depression during today's encounter. They screened negative with a PHQ-2 score of 0.      History of Present Illness     Patient is a 29-year-old male presenting to the office for form completion  He will be applying for , needs clearance for physical agility test    Hx of TBI  [facility]  Was given prescription for imitrex for headaches-states that it is not very helpful  Notes stretching has improved his symptoms, frequency  Still has frontal headaches occasionally  Frequency: 6 headache days per month, 2 days per month with severe headaches  Patient notes that he did have MRI completed several months ago.  He does not have report available with him, however was told that gray and white matter appeared normal.  No structural abnormalities     Alcohol use- socially  Caffeine and soda use- 1 cup of coffee every 2 days, no longer taking energy drinks. Lower dose preworkout that he will take several times per week, notes he does not drink coffee on the days that he takes preworkout  Nicotine use-former smoker, chewed tobacco for about 10 years and vaped for 2.  No current nicotine use  Recreational drug use-  none    Rt posterior labrum repair 10/18/23   Per orthopedic note 2/5/2024, right shoulder healthy on exam  Notes he has been back to the gym, and no limitations to activity  Using theragun, heating, ice, stretching which improve his range of motion    He also notes that he had labs done within the last 6 months [facility] the [facility]  Results not available.  Patient asked to have results faxed to our office  He notes that his LDL was slightly high, otherwise reports no abnormalities  He is  "not on cholesterol-lowering medication at this time          Review of Systems   Constitutional:  Negative for chills, fatigue and fever.   HENT:  Negative for congestion, ear pain, postnasal drip, rhinorrhea, sinus pressure, sore throat and trouble swallowing.    Eyes:  Negative for pain and visual disturbance.   Respiratory:  Negative for cough, chest tightness, shortness of breath and wheezing.    Cardiovascular:  Negative for chest pain, palpitations and leg swelling.   Gastrointestinal:  Negative for abdominal pain, blood in stool, constipation, diarrhea, nausea and vomiting.   Genitourinary:  Negative for difficulty urinating, dysuria, frequency, hematuria and urgency.   Neurological:  Positive for headaches. Negative for dizziness, weakness, light-headedness and numbness.   Psychiatric/Behavioral:  Negative for dysphoric mood and sleep disturbance. The patient is not nervous/anxious.    All other systems reviewed and are negative.    Medical History Reviewed by provider this encounter:  Tobacco  Allergies  Meds  Problems  Med Hx  Surg Hx  Fam Hx           Objective     /70 (BP Location: Left arm, Patient Position: Sitting, Cuff Size: Large)   Pulse 62   Temp 97.7 °F (36.5 °C) (Temporal)   Ht 5' 11.65\" (1.82 m)   Wt 105 kg (230 lb 6.4 oz)   SpO2 97%   BMI 31.55 kg/m²     Physical Exam  Vitals and nursing note reviewed.   Constitutional:       General: He is not in acute distress.     Appearance: Normal appearance. He is not ill-appearing.   HENT:      Head: Normocephalic and atraumatic.      Right Ear: Tympanic membrane, ear canal and external ear normal.      Left Ear: Tympanic membrane, ear canal and external ear normal.      Nose: Nose normal. No rhinorrhea.      Mouth/Throat:      Mouth: Mucous membranes are moist.      Pharynx: Oropharynx is clear. No oropharyngeal exudate or posterior oropharyngeal erythema.   Eyes:      General: No scleral icterus.     Extraocular Movements: " Extraocular movements intact.      Conjunctiva/sclera: Conjunctivae normal.      Pupils: Pupils are equal, round, and reactive to light.   Neck:      Thyroid: No thyroid mass or thyromegaly.   Cardiovascular:      Rate and Rhythm: Normal rate and regular rhythm.      Heart sounds: Normal heart sounds. No murmur heard.     No friction rub. No gallop.   Pulmonary:      Effort: Pulmonary effort is normal. No respiratory distress.      Breath sounds: Normal breath sounds. No wheezing, rhonchi or rales.   Chest:      Chest wall: No tenderness.   Musculoskeletal:         General: Normal range of motion.      Cervical back: Normal range of motion. No tenderness.      Right lower leg: No edema.      Left lower leg: No edema.   Lymphadenopathy:      Cervical: No cervical adenopathy.   Skin:     General: Skin is warm and dry.      Coloration: Skin is not pale.      Findings: No erythema, lesion or rash.   Neurological:      General: No focal deficit present.      Mental Status: He is alert and oriented to person, place, and time. Mental status is at baseline.      Motor: No weakness.      Coordination: Coordination normal.      Gait: Gait normal.   Psychiatric:         Mood and Affect: Mood normal.         Behavior: Behavior normal.       Administrative Statements     Disclaimer: This note was generated with voice recognition software.  Phonetic, grammatical, and spelling errors may be present as a result.  Please contact provider with any concerns or questions

## (undated) DEVICE — IDEAL SUTURE SHUTTLE, 45 DEGREES LEFT: Brand: IDEAL

## (undated) DEVICE — 3M™ IOBAN™ 2 ANTIMICROBIAL INCISE DRAPE 6650EZ: Brand: IOBAN™ 2

## (undated) DEVICE — SKN PREP SPNG STKS PVP PNT STR: Brand: MEDLINE INDUSTRIES, INC.

## (undated) DEVICE — IDEAL SUTURE SHUTTLE, 45 DEGREES RIGHT: Brand: IDEAL

## (undated) DEVICE — BLADE SHAVER DISSECTOR 3.5MM 13CM COOLCUT

## (undated) DEVICE — PACK PBDS SHOULDER ARTHROSCOPY RF

## (undated) DEVICE — 3M™ STERI-STRIP™ BLEND TONE SKIN CLOSURES, B1557, TAN, 1/2 IN X 4 IN (12MM X 100MM), 6 STRIPS/ENVELOPE: Brand: 3M™ STERI-STRIP™

## (undated) DEVICE — DRESSING MEPILEX AG BORDER 4 X 4 IN

## (undated) DEVICE — INTENDED FOR TISSUE SEPARATION, AND OTHER PROCEDURES THAT REQUIRE A SHARP SURGICAL BLADE TO PUNCTURE OR CUT.: Brand: BARD-PARKER ® CARBON RIB-BACK BLADES

## (undated) DEVICE — 3M™ STERI-STRIP™ REINFORCED ADHESIVE SKIN CLOSURES, R1542, 1/4 IN X 1-1/2 IN (6 MM X 38 MM), 6 STRIPS/ENVELOPE: Brand: 3M™ STERI-STRIP™

## (undated) DEVICE — GLOVE SRG BIOGEL 7.5

## (undated) DEVICE — SUT MONOCRYL 4-0 PS-2 27 IN Y426H

## (undated) DEVICE — SHOULDER SUSPENSION KIT 6 PER BOX

## (undated) DEVICE — TUBING ARTHROSCOPY REDUCE PUMP

## (undated) DEVICE — THREADED CLEAR CANNULA WITH OBTURATOR 7MM X 75MM

## (undated) DEVICE — THREADED CLEAR CANNULA WITH OBTURATOR 8.5MM X 75MM

## (undated) DEVICE — TUBING SUCTION 5MM X 12 FT

## (undated) DEVICE — GLOVE SRG BIOGEL ECLIPSE 7

## (undated) DEVICE — TUBING ARTHROSCOPY REDUCE PATIENT

## (undated) DEVICE — GLOVE INDICATOR PI UNDERGLOVE SZ 7.5 BLUE